# Patient Record
Sex: FEMALE | Race: BLACK OR AFRICAN AMERICAN | NOT HISPANIC OR LATINO | Employment: FULL TIME | URBAN - METROPOLITAN AREA
[De-identification: names, ages, dates, MRNs, and addresses within clinical notes are randomized per-mention and may not be internally consistent; named-entity substitution may affect disease eponyms.]

---

## 2021-02-15 ENCOUNTER — TRANSFERRED RECORDS (OUTPATIENT)
Dept: MULTI SPECIALTY CLINIC | Facility: CLINIC | Age: 26
End: 2021-02-15

## 2021-02-15 LAB — PAP-ABSTRACT: NORMAL

## 2021-08-10 ENCOUNTER — OFFICE VISIT (OUTPATIENT)
Dept: URGENT CARE | Facility: URGENT CARE | Age: 26
End: 2021-08-10
Payer: COMMERCIAL

## 2021-08-10 VITALS
WEIGHT: 174 LBS | OXYGEN SATURATION: 100 % | DIASTOLIC BLOOD PRESSURE: 119 MMHG | HEART RATE: 82 BPM | SYSTOLIC BLOOD PRESSURE: 171 MMHG | RESPIRATION RATE: 16 BRPM | TEMPERATURE: 97.5 F

## 2021-08-10 DIAGNOSIS — E87.6 LOW BLOOD POTASSIUM: Primary | ICD-10-CM

## 2021-08-10 DIAGNOSIS — R03.0 ELEVATED BLOOD PRESSURE READING WITHOUT DIAGNOSIS OF HYPERTENSION: ICD-10-CM

## 2021-08-10 PROCEDURE — 99203 OFFICE O/P NEW LOW 30 MIN: CPT | Performed by: PHYSICIAN ASSISTANT

## 2021-08-11 NOTE — PATIENT INSTRUCTIONS
Sent here by endocrinologist for correction of low potassium and an EKG.  Patient is not having any chest pain.  Potassium yesterday was 2.8.  We do not do IV potassium.  To [facility] for evaluation and treatment.

## 2021-08-11 NOTE — PROGRESS NOTES
Chief Complaint   Patient presents with     Abnormal Labs     Abnormal labs, low potassium and thyroid.             ASSESSMENT:    ICD-10-CM    1. Low blood potassium  E87.6    2. Elevated blood pressure reading without diagnosis of hypertension  R03.0              PLAN: We cannot do IV supplementation of potassium.  Low potassium of 2.8.  This could cause ectopic beats.  Recommend she go to the ER for evaluation and treatment.  BP elevated here. Patient will go to [place].  Advised about symptoms which might herald more serious problems.          Loulou Leung PA-C        Subjective: 25-year-old female is sent here by her endocrinologist for correction of her low potassium and EKG.  She denies any chest pain.  Potassium yesterday was 2.8.  Prior hospitalization in February 2001 for anemia, low potassium and palpitations.    No Known Allergies    No past medical history on file.    No current outpatient medications on file prior to visit.  No current facility-administered medications on file prior to visit.      Social History     Tobacco Use     Smoking status: Never Smoker     Smokeless tobacco: Never Used   Substance Use Topics     Alcohol use: None     Drug use: None       ROS:  General: negative for fever  ABD: Denies abd pain  : as above    OBJECTIVE:  BP (!) 171/119 (BP Location: Left arm, Patient Position: Sitting, Cuff Size: Adult Regular)   Pulse 82   Temp 97.5  F (36.4  C) (Tympanic)   Resp 16   Wt 78.9 kg (174 lb)   LMP 07/23/2021   SpO2 100%   Breastfeeding No    General:   awake, alert, and cooperative.  NAD.   Head: Normocephalic, atraumatic.  Eyes: Conjunctiva clear, non icteric.   ABD: soft, no tenderness to palpation , no rigidity, guarding or rebound . No CVAT  Neuro: Alert and oriented - normal speech.

## 2021-12-10 NOTE — PROGRESS NOTES
Nurse Note      Itinerary:  [facility]      Departure Date: 12/26/2021      Return Date: 01/14/2022      Length of Trip 22 DAYS      Reason for Travel: Visiting friends and relatives           Urban or rural: urban      Accommodations: Family home        IMMUNIZATION HISTORY  Have you received any immunizations within the past 4 weeks?  Yes  Have you ever fainted from having your blood drawn or from an injection?  No  Have you ever had a fever reaction to vaccination?  No  Have you ever had any bad reaction or side effect from any vaccination?  No  Have you ever had hepatitis A or B vaccine?  Yes  Do you live (or work closely) with anyone who has AIDS, an AIDS-like condition, any other immune disorder or who is on chemotherapy for cancer?  No  Do you have a family history of immunodeficiency?  No  Have you received any injection of immune globulin or any blood products during the past 12 months?  No    Patient roomed by             Jose June is a 26 year old female seen today alone for counsultation for international travel.   Patient will be departing in  11 day(s) and  traveling with family member(s).      Patient itinerary :  will be in the urban region [facility] which risk for Malaria, Yellow Fever, food borne illnesses, motor vehicle accidents and Covid. exposure.      Patient's activities will include visiting friends and relatives.    Patient's country of birth is [place]    Special medical concerns: HypoKalemia and elevated BP  Pre-travel questionnaire was completed by patient and reviewed by provider.     Vitals: BP (!) 147/96 (BP Location: Right arm, Patient Position: Sitting, Cuff Size: Adult Regular)   Pulse 97   Temp 98  F (36.7  C) (Oral)   BMI= There is no height or weight on file to calculate BMI.    EXAM:  General:  Well-nourished, well-developed in no acute distress.  Appears to be stated age, interacts appropriately and expresses understanding of information given to patient.    Current  Outpatient Medications   Medication Sig Dispense Refill     atovaquone-proguanil (MALARONE) 250-100 MG tablet Take 1 tablet by mouth daily Start 2 days before exposure to Malaria and continue daily till  7 days after exposure. 35 tablet 0     azithromycin (ZITHROMAX) 500 MG tablet Take 1 tablet (500 mg) by mouth daily for 3 doses Take 1 tablet a day for up to 3 days for severe diarrhea 3 tablet 0     potassium chloride ER (KLOR-CON) 10 MEQ CR tablet Take 1 tablet (10 mEq) by mouth daily       There is no problem list on file for this patient.    No Known Allergies      Immunizations discussed include:   Covid 19: Ordered/given today, risks, benefits and side effects reviewed  Hepatitis A:  Ordered/given today, risks, benefits and side effects reviewed  Hepatitis B: Up to date  Influenza: Up to date  Typhoid: Ordered/given today, risks, benefits and side effects reviewed  Rabies: Declined  reviewed managment of a animal bite or scratch (washing wound, seek medical care within 24 hours for post exposure prophylaxis )  Yellow Fever: Yellow Fever ordered/given today - side effects, precautions, allergies, risks discussed. Patient expressed understanding.  Kazakh Encephalitis: Not indicated  Meningococcus: Ordered/given today, risks, benefits and side effects reviewed  Tetanus/Diphtheria: Up to date  Measles/Mumps/Rubella: Up to date  Cholera: Not needed  Polio: Up to date  Pneumococcal: Under age of 65  Varicella: Up to date  Shingrix: Not indicated  HPV:  Up to date     TB: tested for Nursing program    Altitude Exposure on this trip: no  Past tolerance to Altitude: na    ASSESSMENT/PLAN:  Philipp was seen today for travel clinic and imm/inj.    Diagnoses and all orders for this visit:    Travel advice encounter  -     YELLOW FEVER IMMUNIZATN,LIVE,SUBCUT  -     HEPA-ADULT  -     TYPHOID VACCINE, IM  -     atovaquone-proguanil (MALARONE) 250-100 MG tablet; Take 1 tablet by mouth daily Start 2 days before exposure to  Malaria and continue daily till  7 days after exposure.  -     azithromycin (ZITHROMAX) 500 MG tablet; Take 1 tablet (500 mg) by mouth daily for 3 doses Take 1 tablet a day for up to 3 days for severe diarrhea    High priority for 2019-nCoV vaccine  -     COVID-19,PF,PFIZER (12+ Yrs PURPLE LABEL)    Encounter for screening laboratory testing for COVID-19 virus  -     Asymptomatic COVID-19 Virus (Coronavirus) by PCR Nose; Future    Other orders  -     MENINGOCOCCAL (MENACTRA )      I have reviewed general recommendations for safe travel   including: food/water precautions, insect precautions, safer sex   practices given high prevalence of Zika, HIV and other STDs,   roadway safety. Educational materials and [employer] report provided.    Malaraia prophylaxis recommended: Malarone  Symptomatic treatment for traveler's diarrhea: azithromycin    Personal protective measures reviewed including hand sanitizing and contact precautions for the prevention of viral illnesses. Cover coughs and masking  during travel and upon return.  Current COVID 19 pandemic.   Monitor / follow current CDC guidelines.    Country specific and CDC Covid 19  testing requirements and resources given to patient.    Covid 19 PCR test ordered.  Instructions for scheduling and resulting through My Chart given to patient.         Evacuation insurance advised and resources were provided to patient.    Total visit time 45 minutes  with over 50% of time spent counseling patient as detailed above.    Jagruti Langford CNP

## 2021-12-15 ENCOUNTER — OFFICE VISIT (OUTPATIENT)
Dept: FAMILY MEDICINE | Facility: CLINIC | Age: 26
End: 2021-12-15
Payer: COMMERCIAL

## 2021-12-15 VITALS — SYSTOLIC BLOOD PRESSURE: 147 MMHG | TEMPERATURE: 98 F | HEART RATE: 97 BPM | DIASTOLIC BLOOD PRESSURE: 96 MMHG

## 2021-12-15 DIAGNOSIS — Z23 HIGH PRIORITY FOR 2019-NCOV VACCINE: ICD-10-CM

## 2021-12-15 DIAGNOSIS — Z71.84 TRAVEL ADVICE ENCOUNTER: Primary | ICD-10-CM

## 2021-12-15 DIAGNOSIS — Z11.52 ENCOUNTER FOR SCREENING LABORATORY TESTING FOR COVID-19 VIRUS: ICD-10-CM

## 2021-12-15 PROCEDURE — 90472 IMMUNIZATION ADMIN EACH ADD: CPT | Mod: GA | Performed by: NURSE PRACTITIONER

## 2021-12-15 PROCEDURE — 90632 HEPA VACCINE ADULT IM: CPT | Mod: GA | Performed by: NURSE PRACTITIONER

## 2021-12-15 PROCEDURE — 90691 TYPHOID VACCINE IM: CPT | Mod: GA | Performed by: NURSE PRACTITIONER

## 2021-12-15 PROCEDURE — 0004A COVID-19,PF,PFIZER (12+ YRS): CPT | Performed by: NURSE PRACTITIONER

## 2021-12-15 PROCEDURE — 90717 YELLOW FEVER VACCINE SUBQ: CPT | Mod: GA | Performed by: NURSE PRACTITIONER

## 2021-12-15 PROCEDURE — 91300 COVID-19,PF,PFIZER (12+ YRS): CPT | Performed by: NURSE PRACTITIONER

## 2021-12-15 PROCEDURE — 90734 MENACWYD/MENACWYCRM VACC IM: CPT | Mod: GA | Performed by: NURSE PRACTITIONER

## 2021-12-15 PROCEDURE — 90471 IMMUNIZATION ADMIN: CPT | Mod: GA | Performed by: NURSE PRACTITIONER

## 2021-12-15 PROCEDURE — 99403 PREV MED CNSL INDIV APPRX 45: CPT | Mod: 25 | Performed by: NURSE PRACTITIONER

## 2021-12-15 RX ORDER — ATOVAQUONE AND PROGUANIL HYDROCHLORIDE 250; 100 MG/1; MG/1
1 TABLET, FILM COATED ORAL DAILY
Qty: 35 TABLET | Refills: 0 | Status: SHIPPED | OUTPATIENT
Start: 2021-12-15 | End: 2022-06-23

## 2021-12-15 RX ORDER — POTASSIUM CHLORIDE 750 MG/1
10 TABLET, EXTENDED RELEASE ORAL DAILY
COMMUNITY
Start: 2021-12-15 | End: 2022-04-29

## 2021-12-15 RX ORDER — AZITHROMYCIN 500 MG/1
500 TABLET, FILM COATED ORAL DAILY
Qty: 3 TABLET | Refills: 0 | Status: SHIPPED | OUTPATIENT
Start: 2021-12-15 | End: 2021-12-18

## 2021-12-15 NOTE — PATIENT INSTRUCTIONS
Thank you for visiting the [facility] : [phone]  Today December 15, 2021 you received the    Hepatitis A Vaccine - Please return on 6/13/22 or later for your 2nd and final dose.    Yellow Fever (YF)    Typhoid - injectable. This vaccine is valid for two years.     COVID 19 Pfizer    Meningitis     Follow up vaccine appointments can be made as a NURSE ONLY visit at the Travel Clinic, (BE PREPARED TO WAIT, ) or at designated [facility].    If you are receiving the Rabies vaccines series, it is important that you follow the exact schedule ordered.     Pre-travel     We recommend that you purchase Medical Evacuation Insurance prior to your departure.  Https://wwwnc.cdc.gov/travel/page/insurance    [place] your travel plans with the [employer] Department of State through STEP ( Smart Traveler Enrollment Program ) https://step.state.gov.  STEP is a free service to allow U[place] citizens and nationals traveling and living abroad to enroll their trip with the nearest U.S. Embassy or Consulate.    Animal Exposure: Avoid all mammals even if they look healthy.  If there is a bite, scratch or even a lick, wash area immediately with soap and water for 15 minutes and seek medical care within 24 hours for evaluation of Rabies post exposure treatment.  Contact your Medical Evacuation Insurance.    COVID 19 (Sars Cov2) prevention strategies  Physical distancing: Maintain 6 foot (2m) from others.              Avoid large gatherings and public transportation.   Avoid indoor shopping malls, theaters and restaurants   Practice consistent mask wearing covering the nose, mouth and underneath the chin when unable to maintain 6 foot distance from others.  Hand washing: frequent, thorough handwashing with soap and water for 20 seconds (or using a hand  containing 60% alcohol)   Avoid touching face, nose, eyes, mouth unless you have done appropriate hand washing as above.   Clean high  touch surfaces with approved disinfectant against Covid 19  (70% Ethanol ) or a bleach solution (add 20 mL (4 teaspoons) of bleach to 1 L (1 quart) of water;)  Be careful not to breath or touch bleach.      Travel Covid 19 Testing:  updated 12/06/2021  International travelers: Pre-travel: diagnostic testing (antigen or PCR) may be required for entry:  See country specific Embassy websites or airline websites.    US ENTRY Requirements: Effective December 6, 2021, all international arrivals to the [place] (regardless of vaccination status or citizenship status) by air are required to have a negative predeparture COVID-19 result from a test taken no more than 1 calendar day prior to departure of the flight to the [place]. Many complex scenarios may result from the 1-day rule. For example, for a flight that arrives in the [place] on a Monday, the test must have been taken no earlier than Sunday local time in the departure city. If the itinerary contains multiple flights, the test can be taken 1 day prior to departure of the first flight or can be taken en route, as long as the connecting airport is not in the [place]. If the test is unable to be taken en route, the traveler will not be able to enter the [place], or if the test is taken en route and is positive, the traveler will have to isolate in that location. If the itinerary contains 1 or more overnight stays en route to the [place], the test must have been taken 1 calendar day before the flight that will enter the [place]; however, if the itinerary has an overnight connection due to limitations in flight availability, retesting will not be required. If the first flight within an itinerary is delayed due to severe weather, aircraft mechanical issues, or other issues outside of the traveler's control, the traveler will only need to be retested if the delay causes the original test to be 24 hours or more past the 1-day window. If a connecting flight is delayed due to any of the above issues, the  traveler will only need to be retested if the delay causes the original test to be 48 hours or more past the 1-day window. If a trip of less than 1 day is made out the [place], a viral test taken in the [place] may be used as a predeparture test as long as the test was taken no more than 1 day prior to rearrival in the [place]; however, if a delay occurs on the return trip and the predeparture test is out of the 1-day window, the traveler will need to be retested before returning to the [place]. Noncitizen nonimmigrants who are unvaccinated remain banned from entry    Post travel: CDC recommends getting tested 3-5 days after your trip AND stay home and self-quarantine for 7 days.      COVID-19 testing scheduling number for pre-travel through [facility]  [phone] (Must have an order). Available 24 hours a day.  You can also schedule through My Chart.     Post-travel illness:  Contact your provider or [facility] if you develop a fever, rash, cough, diarrhea or other symptoms for up to 1 year after travel.  Inform your healthcare provider when and where you traveled to.    Please call the [facility] with any questions [phone]  Or send your provider a 'My Chart' note.         https://tg.[employer].gov/q-v-jlwxbfa-services/security-and-travel-information/covid-19/    [email or web address]/[email or web address]

## 2021-12-23 ENCOUNTER — LAB (OUTPATIENT)
Dept: URGENT CARE | Facility: URGENT CARE | Age: 26
End: 2021-12-23
Attending: NURSE PRACTITIONER
Payer: COMMERCIAL

## 2021-12-23 DIAGNOSIS — Z11.52 ENCOUNTER FOR SCREENING LABORATORY TESTING FOR COVID-19 VIRUS: ICD-10-CM

## 2021-12-23 PROCEDURE — U0003 INFECTIOUS AGENT DETECTION BY NUCLEIC ACID (DNA OR RNA); SEVERE ACUTE RESPIRATORY SYNDROME CORONAVIRUS 2 (SARS-COV-2) (CORONAVIRUS DISEASE [COVID-19]), AMPLIFIED PROBE TECHNIQUE, MAKING USE OF HIGH THROUGHPUT TECHNOLOGIES AS DESCRIBED BY CMS-2020-01-R: HCPCS

## 2021-12-23 PROCEDURE — U0005 INFEC AGEN DETEC AMPLI PROBE: HCPCS

## 2021-12-24 LAB — SARS-COV-2 RNA RESP QL NAA+PROBE: NEGATIVE

## 2021-12-26 NOTE — RESULT ENCOUNTER NOTE
Philipp June  YOB: 1995  Specimen Collected: 12/23/21  4:01 PM  CST    Your Covid 19 PCR test results are NEGATIVE.  Print off the entire results and keep with your travel documents    Have a safe trip    Jagruti Langford (Lori) CNP

## 2022-02-06 ENCOUNTER — HEALTH MAINTENANCE LETTER (OUTPATIENT)
Age: 27
End: 2022-02-06

## 2022-02-08 PROBLEM — H52.13 MYOPIA OF BOTH EYES WITH ASTIGMATISM: Status: ACTIVE | Noted: 2019-04-22

## 2022-02-08 PROBLEM — R94.6 ABNORMAL RESULTS OF THYROID FUNCTION STUDIES: Status: ACTIVE | Noted: 2021-10-05

## 2022-02-08 PROBLEM — H04.123 DRY EYES: Status: ACTIVE | Noted: 2019-04-22

## 2022-02-08 PROBLEM — A18.2: Status: ACTIVE | Noted: 2018-12-31

## 2022-02-08 PROBLEM — E21.1 HYPERPARATHYROIDISM DUE TO VITAMIN D DEFICIENCY (H): Status: ACTIVE | Noted: 2021-10-14

## 2022-02-08 PROBLEM — E55.9 VITAMIN D DEFICIENCY: Status: ACTIVE | Noted: 2021-10-05

## 2022-02-08 PROBLEM — H52.203 MYOPIA OF BOTH EYES WITH ASTIGMATISM: Status: ACTIVE | Noted: 2019-04-22

## 2022-02-08 PROBLEM — H57.89 REDNESS OF BOTH EYES: Status: ACTIVE | Noted: 2019-03-27

## 2022-02-08 PROBLEM — R00.2 PALPITATION: Status: ACTIVE | Noted: 2018-12-31

## 2022-02-08 NOTE — PROGRESS NOTES
"  Assessment & Plan       ICD-10-CM    1. Hypertension goal BP (blood pressure) < 140/90  I10 EKG 12-lead complete w/read - Clinics     EKG 12-lead complete w/read - Clinics     metoprolol succinate ER (TOPROL-XL) 25 MG 24 hr tablet     Echocardiogram Complete     US Renal Complete w Doppler Complete     Comprehensive metabolic panel (BMP + Alb, Alk Phos, ALT, AST, Total. Bili, TP)     Vitamin D Deficiency     Comprehensive metabolic panel (BMP + Alb, Alk Phos, ALT, AST, Total. Bili, TP)     Vitamin D Deficiency   2. Vitamin D deficiency  E55.9    3. Hyperparathyroidism due to vitamin D deficiency (H)  E21.1    Talk to patient about her concerns.  Labs are pending.  We will get her started on metoprolol 25 mg a day we will recheck her blood pressure in 2 weeks.  We will get an echo and a renal ultrasound done in the meantime.      BMI:   Estimated body mass index is 27.98 kg/m  as calculated from the following:    Height as of this encounter: 1.727 m (5' 8\").    Weight as of this encounter: 83.5 kg (184 lb).   Weight management plan: Discussed healthy diet and exercise guidelines      Return in about 2 weeks (around 2/25/2022) for BP Recheck.    Kenny Nowak PA-C  [facility]    Jose Pittman is a 26 year old who presents for the following health issues     HPI     Patient is in clinic for high blood pressure for the past couple months. Patient would also like to have Potassium and vitamin D levels checked.      STEFANIA Hassan    Has been seen in outside clinic and had elevated blood pressure readings.  She thinks is been going on for over a year.  She denies any chest pain or shortness of breath.  She is in nursing school.  She recently was seen in outside endocrinologist for low TSH.  She had follow-up lab work that appear to be stable.  Care everywhere was reviewed.  She denies any family history of heart disease.  She states she otherwise feels fine.  She denies any " "daytime fatigue.  She states she exercises regularly with no symptoms.  Review of Systems   Constitutional, HEENT, cardiovascular, pulmonary, gi and gu systems are negative, except as otherwise noted.      Objective    BP (!) 171/117   Pulse 80   Temp 98.2  F (36.8  C) (Tympanic)   Resp 18   Ht 1.727 m (5' 8\")   Wt 83.5 kg (184 lb)   SpO2 100%   BMI 27.98 kg/m    Body mass index is 27.98 kg/m .  Physical Exam   GENERAL: healthy, alert and no distress  EYES: Eyes grossly normal to inspection, PERRL and conjunctivae and sclerae normal  HENT: ear canals and TM's normal, nose and mouth without ulcers or lesions  NECK: no adenopathy, no asymmetry, masses, or scars and thyroid normal to palpation  RESP: lungs clear to auscultation - no rales, rhonchi or wheezes  CV: regular rate and rhythm, normal S1 S2, no S3 or S4, no murmur, click or rub, no peripheral edema and peripheral pulses strong  ABDOMEN: soft, nontender, no hepatosplenomegaly, no masses and bowel sounds normal  MS: no gross musculoskeletal defects noted, no edema  SKIN: no suspicious lesions or rashes  NEURO: Normal strength and tone, mentation intact and speech normal  PSYCH: mentation appears normal, affect normal/bright    Labs pending  EKG: NSR, none to compare.   We are having difficulties with the machine so she had an abnormal EKG first reading and then we got a different machine and it in the been normal.          "

## 2022-02-11 ENCOUNTER — OFFICE VISIT (OUTPATIENT)
Dept: FAMILY MEDICINE | Facility: CLINIC | Age: 27
End: 2022-02-11
Payer: COMMERCIAL

## 2022-02-11 VITALS
RESPIRATION RATE: 18 BRPM | DIASTOLIC BLOOD PRESSURE: 117 MMHG | HEART RATE: 80 BPM | OXYGEN SATURATION: 100 % | HEIGHT: 68 IN | WEIGHT: 184 LBS | SYSTOLIC BLOOD PRESSURE: 171 MMHG | TEMPERATURE: 98.2 F | BODY MASS INDEX: 27.89 KG/M2

## 2022-02-11 DIAGNOSIS — E21.1 HYPERPARATHYROIDISM DUE TO VITAMIN D DEFICIENCY (H): ICD-10-CM

## 2022-02-11 DIAGNOSIS — E55.9 VITAMIN D DEFICIENCY: ICD-10-CM

## 2022-02-11 DIAGNOSIS — R93.429 ABNORMAL ULTRASOUND OF KIDNEY: ICD-10-CM

## 2022-02-11 DIAGNOSIS — I10 HYPERTENSION GOAL BP (BLOOD PRESSURE) < 140/90: Primary | ICD-10-CM

## 2022-02-11 LAB
ALBUMIN SERPL-MCNC: 3.9 G/DL (ref 3.4–5)
ALP SERPL-CCNC: 70 U/L (ref 40–150)
ALT SERPL W P-5'-P-CCNC: 30 U/L (ref 0–50)
ANION GAP SERPL CALCULATED.3IONS-SCNC: 2 MMOL/L (ref 3–14)
AST SERPL W P-5'-P-CCNC: 19 U/L (ref 0–45)
BILIRUB SERPL-MCNC: 0.6 MG/DL (ref 0.2–1.3)
BUN SERPL-MCNC: 8 MG/DL (ref 7–30)
CALCIUM SERPL-MCNC: 8.5 MG/DL (ref 8.5–10.1)
CHLORIDE BLD-SCNC: 107 MMOL/L (ref 94–109)
CO2 SERPL-SCNC: 31 MMOL/L (ref 20–32)
CREAT SERPL-MCNC: 0.65 MG/DL (ref 0.52–1.04)
GFR SERPL CREATININE-BSD FRML MDRD: >90 ML/MIN/1.73M2
GLUCOSE BLD-MCNC: 87 MG/DL (ref 70–99)
POTASSIUM BLD-SCNC: 3.5 MMOL/L (ref 3.4–5.3)
PROT SERPL-MCNC: 7.7 G/DL (ref 6.8–8.8)
SODIUM SERPL-SCNC: 140 MMOL/L (ref 133–144)

## 2022-02-11 PROCEDURE — 93000 ELECTROCARDIOGRAM COMPLETE: CPT | Performed by: PHYSICIAN ASSISTANT

## 2022-02-11 PROCEDURE — 80053 COMPREHEN METABOLIC PANEL: CPT | Performed by: PHYSICIAN ASSISTANT

## 2022-02-11 PROCEDURE — 82306 VITAMIN D 25 HYDROXY: CPT | Performed by: PHYSICIAN ASSISTANT

## 2022-02-11 PROCEDURE — 99214 OFFICE O/P EST MOD 30 MIN: CPT | Performed by: PHYSICIAN ASSISTANT

## 2022-02-11 PROCEDURE — 36415 COLL VENOUS BLD VENIPUNCTURE: CPT | Performed by: PHYSICIAN ASSISTANT

## 2022-02-11 RX ORDER — BIOTIN 1 MG
1000 TABLET ORAL EVERY OTHER DAY
COMMUNITY
End: 2022-12-12

## 2022-02-11 RX ORDER — METOPROLOL SUCCINATE 25 MG/1
25 TABLET, EXTENDED RELEASE ORAL DAILY
Qty: 30 TABLET | Refills: 0 | Status: SHIPPED | OUTPATIENT
Start: 2022-02-11 | End: 2022-03-15

## 2022-02-11 RX ORDER — POTASSIUM CHLORIDE 750 MG/1
10 TABLET, EXTENDED RELEASE ORAL DAILY
COMMUNITY
Start: 2022-01-08 | End: 2022-02-11

## 2022-02-11 ASSESSMENT — MIFFLIN-ST. JEOR: SCORE: 1623.12

## 2022-02-11 ASSESSMENT — PAIN SCALES - GENERAL: PAINLEVEL: NO PAIN (0)

## 2022-02-14 LAB — DEPRECATED CALCIDIOL+CALCIFEROL SERPL-MC: 49 UG/L (ref 20–75)

## 2022-02-15 NOTE — RESULT ENCOUNTER NOTE
Ms. Slade,    All of your labs were normal/near normal for you.    Please contact the clinic if you have additional questions.  Thank you.    Sincerely,    Kenny Nowak PA-C

## 2022-02-18 ENCOUNTER — ANCILLARY PROCEDURE (OUTPATIENT)
Dept: ULTRASOUND IMAGING | Facility: CLINIC | Age: 27
End: 2022-02-18
Attending: PHYSICIAN ASSISTANT
Payer: COMMERCIAL

## 2022-02-18 DIAGNOSIS — I10 HYPERTENSION GOAL BP (BLOOD PRESSURE) < 140/90: ICD-10-CM

## 2022-02-18 PROCEDURE — 93975 VASCULAR STUDY: CPT | Performed by: RADIOLOGY

## 2022-02-28 ENCOUNTER — MYC MEDICAL ADVICE (OUTPATIENT)
Dept: FAMILY MEDICINE | Facility: CLINIC | Age: 27
End: 2022-02-28
Payer: COMMERCIAL

## 2022-03-10 ENCOUNTER — ANCILLARY PROCEDURE (OUTPATIENT)
Dept: CARDIOLOGY | Facility: CLINIC | Age: 27
End: 2022-03-10
Attending: PHYSICIAN ASSISTANT
Payer: COMMERCIAL

## 2022-03-10 DIAGNOSIS — I10 HYPERTENSION GOAL BP (BLOOD PRESSURE) < 140/90: ICD-10-CM

## 2022-03-10 LAB — LVEF ECHO: NORMAL

## 2022-03-10 PROCEDURE — 93306 TTE W/DOPPLER COMPLETE: CPT | Performed by: INTERNAL MEDICINE

## 2022-03-14 DIAGNOSIS — I10 HYPERTENSION GOAL BP (BLOOD PRESSURE) < 140/90: ICD-10-CM

## 2022-03-14 NOTE — TELEPHONE ENCOUNTER
Routing refill request to provider for review/approval because:  BP Readings from Last 3 Encounters:   02/11/22 (!) 171/117   12/15/21 (!) 147/96   08/10/21 (!) 171/119     Jagruti PANDYAN, RN

## 2022-03-15 RX ORDER — METOPROLOL SUCCINATE 25 MG/1
25 TABLET, EXTENDED RELEASE ORAL DAILY
Qty: 30 TABLET | Refills: 0 | Status: SHIPPED | OUTPATIENT
Start: 2022-03-15 | End: 2022-04-29

## 2022-03-15 NOTE — TELEPHONE ENCOUNTER
Called patient this morning had to leave a telephone msg. Jia Weaver     [facility]  [facility]

## 2022-04-29 ENCOUNTER — OFFICE VISIT (OUTPATIENT)
Dept: CARDIOLOGY | Facility: CLINIC | Age: 27
End: 2022-04-29
Payer: COMMERCIAL

## 2022-04-29 VITALS
SYSTOLIC BLOOD PRESSURE: 157 MMHG | HEART RATE: 94 BPM | BODY MASS INDEX: 27.69 KG/M2 | OXYGEN SATURATION: 100 % | DIASTOLIC BLOOD PRESSURE: 117 MMHG | WEIGHT: 182.1 LBS

## 2022-04-29 DIAGNOSIS — I10 HYPERTENSION GOAL BP (BLOOD PRESSURE) < 140/90: Primary | ICD-10-CM

## 2022-04-29 PROCEDURE — 99203 OFFICE O/P NEW LOW 30 MIN: CPT | Performed by: INTERNAL MEDICINE

## 2022-04-29 RX ORDER — LOSARTAN POTASSIUM AND HYDROCHLOROTHIAZIDE 12.5; 5 MG/1; MG/1
1 TABLET ORAL DAILY
Qty: 90 TABLET | Refills: 3 | Status: SHIPPED | OUTPATIENT
Start: 2022-04-29 | End: 2022-05-12

## 2022-04-29 RX ORDER — AMLODIPINE BESYLATE 10 MG/1
10 TABLET ORAL DAILY
Qty: 90 TABLET | Refills: 3 | Status: SHIPPED | OUTPATIENT
Start: 2022-04-29 | End: 2022-04-29

## 2022-04-29 NOTE — NURSING NOTE
Philipp June's goals for this visit include:   Chief Complaint   Patient presents with     New Patient     Referred by Kenny Nowak  Abnormal Echo        She requests these members of her care team be copied on today's visit information: no     PCP: [facility]    Referring Provider:  Kenny Nowak PA-C  [id number] [place] [address]    BP (!) 157/117 (BP Location: Left arm, Patient Position: Chair, Cuff Size: Adult Regular)   Pulse 94   Wt 82.6 kg (182 lb 1.6 oz)   SpO2 100%   BMI 27.69 kg/m      Do you need any medication refills at today's visit? No     Bebeto CHAPPELL MA   Cardiology Team  [facility]

## 2022-04-29 NOTE — PROGRESS NOTES
[facility] Cardiology Consultation:    Assessment and Plan:     1.  Hypertension, uncontrolled: Stop metoprolol and potassium supplements.  Start losartan/HCTZ combo 50/12.5 every day, and check BMP in 1 week.  Asked to monitor BP at home and let us know in 2 weeks    Follow-up in clinic in 3 months      Jitendra Fuchs MD    Cardiac Imaging and Prevention  [facility]  [email or web address] Pager: [phone]    HPI: Referred by her PCP for evaluation of hypertension and recent echo result.  She has recently been diagnosed with elevated blood pressures, and was prescribed metoprolol.  She has taken this on and off and is currently not taking it.  Office blood pressures have been high at least since last year.  She has been trying to focus on improving her diet and exercise more.  There is no clear family history of hypertension.  Her father has elevated blood pressure but is not on any treatment.  She does not know the medical history of her grandparents.  She does not smoke, or drink alcohol, or use illicit drugs.  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  I reviewed her recent EKG and it shows normal sinus rhythm with LVH.  I reviewed her recent echo and it shows normal cardiac function, mild LVH, and no other structural abnormality.  She reports feeling transient fluttering in her chest recently. Denies any chest pain or pressure, shortness of breath/dyspnea, orthopnea, pnd, syncope/presyncope or edema.  Basic labs, including liver function, urinalysis, thyroid function, and lipid profile were normal when checked last year.  She is also been prescribed 10 mEq of potassium supplement.    EXAM:  BP (!) 157/117 (BP Location: Left arm, Patient Position: Chair, Cuff Size: Adult Regular)   Pulse 94   Wt 82.6 kg (182 lb 1.6 oz)   SpO2 100%   BMI 27.69 kg/m    GEN/CONSTITUIONAL: Appears comfortable, in no apparent distress   EYES: No icterus  ENT/MOUTH: Normal  JVP:  Not  visible  RESPIRATORY: Clear to auscultation bilaterally   CARDIOVASCULAR: Regular S1 and S2, no murmurs, rubs, or gallops.   ABDOMEN: Soft, non-tender, positive bowel sounds   NEUROLOGIC: Grossly non-focal   PSYCHIATRIC: Normal affect  EXT: No cyanosis, clubbing, edema. Normal pedal pulses.  Skin: No petechiae, purpura or rash    PAST MEDICAL HISTORY:  Past Medical History:   Diagnosis Date     Benign essential hypertension        CURRENT MEDICATIONS:  Current Outpatient Medications   Medication     biotin 1000 MCG TABS tablet     cholecalciferol (VITAMIN D3) 125 mcg (5000 units) capsule     Multiple Vitamin (MULTIVITAMIN ADULT PO)     potassium chloride ER (K-TAB/KLOR-CON) 10 MEQ CR tablet     atovaquone-proguanil (MALARONE) 250-100 MG tablet     metoprolol succinate ER (TOPROL-XL) 25 MG 24 hr tablet     No current facility-administered medications for this visit.       PAST SURGICAL HISTORY:  No past surgical history on file.    ALLERGIES   No Known Allergies    FAMILY HISTORY:  History reviewed. No pertinent family history.    SOCIAL HISTORY:  Social History     Socioeconomic History     Marital status: Single     Spouse name: Not on file     Number of children: Not on file     Years of education: Not on file     Highest education level: Not on file   Occupational History     Not on file   Tobacco Use     Smoking status: Never Smoker     Smokeless tobacco: Never Used   Substance and Sexual Activity     Alcohol use: Not on file     Drug use: Not on file     Sexual activity: Not on file   Other Topics Concern     Not on file   Social History Narrative     Not on file     Social Determinants of Health     Financial Resource Strain: Not on file   Food Insecurity: Not on file   Transportation Needs: Not on file   Physical Activity: Not on file   Stress: Not on file   Social Connections: Not on file   Intimate Partner Violence: Not on file   Housing Stability: Not on file       ROS:   Constitutional: No fever, chills,  or sweats. No weight gain/loss   ENT: No visual disturbance, ear ache, epistaxis, sore throat  Allergies/Immunologic: Negative.   Respiratory: No cough, hemoptysia  Cardiovascular: As per HPI  GI: No nausea, vomiting, hematemesis, melena, or hematochezia  : No urinary frequency, dysuria, or hematuria  Integument: Negative  Psychiatric: Negative  Neuro: Negative  Endocrinology: Negative   Musculoskeletal: Negative    ADDITIONAL COMMENTS:     I reviewed the patient's medications:     I reviewed the patient's pertinent clinical laboratory studies:     I reviewed the patient's pertinent imaging studies:   I reviewed the patient's ECG:

## 2022-05-01 ENCOUNTER — HOSPITAL ENCOUNTER (EMERGENCY)
Facility: CLINIC | Age: 27
Discharge: HOME | End: 2022-05-01
Attending: EMERGENCY MEDICINE | Admitting: EMERGENCY MEDICINE
Payer: COMMERCIAL

## 2022-05-01 VITALS
RESPIRATION RATE: 16 BRPM | SYSTOLIC BLOOD PRESSURE: 142 MMHG | OXYGEN SATURATION: 97 % | HEART RATE: 60 BPM | DIASTOLIC BLOOD PRESSURE: 106 MMHG | TEMPERATURE: 98 F | WEIGHT: 180 LBS

## 2022-05-01 DIAGNOSIS — R55 PRE-SYNCOPE: ICD-10-CM

## 2022-05-01 DIAGNOSIS — E87.6 HYPOKALEMIA: ICD-10-CM

## 2022-05-01 LAB
ANION GAP SERPL CALCULATED.3IONS-SCNC: 6 MMOL/L (ref 3–14)
ATRIAL RATE - MUSE: 74 BPM
BASOPHILS # BLD AUTO: 0 10E3/UL (ref 0–0.2)
BASOPHILS NFR BLD AUTO: 1 %
BUN SERPL-MCNC: 14 MG/DL (ref 7–30)
CA-I BLD-MCNC: 5.1 MG/DL (ref 4.4–5.2)
CALCIUM SERPL-MCNC: 9.1 MG/DL (ref 8.5–10.1)
CHLORIDE BLD-SCNC: 104 MMOL/L (ref 94–109)
CO2 SERPL-SCNC: 29 MMOL/L (ref 20–32)
CPB POCT: NO
CREAT SERPL-MCNC: 0.77 MG/DL (ref 0.52–1.04)
DIASTOLIC BLOOD PRESSURE - MUSE: NORMAL MMHG
EOSINOPHIL # BLD AUTO: 0.1 10E3/UL (ref 0–0.7)
EOSINOPHIL NFR BLD AUTO: 2 %
ERYTHROCYTE [DISTWIDTH] IN BLOOD BY AUTOMATED COUNT: 13.4 % (ref 10–15)
GFR SERPL CREATININE-BSD FRML MDRD: >90 ML/MIN/1.73M2
GLUCOSE BLD-MCNC: 102 MG/DL (ref 70–99)
GLUCOSE BLD-MCNC: 86 MG/DL (ref 70–99)
HCG UR QL: NEGATIVE
HCO3 BLDV-SCNC: 29 MMOL/L (ref 21–28)
HCT VFR BLD AUTO: 38.3 % (ref 35–47)
HCT VFR BLD CALC: 39 % (ref 35–47)
HGB BLD-MCNC: 13 G/DL (ref 11.7–15.7)
HGB BLD-MCNC: 13.3 G/DL (ref 11.7–15.7)
IMM GRANULOCYTES # BLD: 0 10E3/UL
IMM GRANULOCYTES NFR BLD: 0 %
INTERPRETATION ECG - MUSE: NORMAL
LYMPHOCYTES # BLD AUTO: 1.8 10E3/UL (ref 0.8–5.3)
LYMPHOCYTES NFR BLD AUTO: 31 %
MAGNESIUM SERPL-MCNC: 1.9 MG/DL (ref 1.6–2.3)
MCH RBC QN AUTO: 29.8 PG (ref 26.5–33)
MCHC RBC AUTO-ENTMCNC: 33.9 G/DL (ref 31.5–36.5)
MCV RBC AUTO: 88 FL (ref 78–100)
MONOCYTES # BLD AUTO: 0.5 10E3/UL (ref 0–1.3)
MONOCYTES NFR BLD AUTO: 8 %
NEUTROPHILS # BLD AUTO: 3.3 10E3/UL (ref 1.6–8.3)
NEUTROPHILS NFR BLD AUTO: 58 %
NRBC # BLD AUTO: 0 10E3/UL
NRBC BLD AUTO-RTO: 0 /100
P AXIS - MUSE: 60 DEGREES
PCO2 BLDV: 47 MM HG (ref 40–50)
PH BLDV: 7.4 [PH] (ref 7.32–7.43)
PLATELET # BLD AUTO: 240 10E3/UL (ref 150–450)
PO2 BLDV: 18 MM HG (ref 25–47)
POTASSIUM BLD-SCNC: 2.5 MMOL/L (ref 3.4–5.3)
POTASSIUM BLD-SCNC: 2.7 MMOL/L (ref 3.4–5.3)
POTASSIUM BLD-SCNC: 2.9 MMOL/L (ref 3.4–5.3)
PR INTERVAL - MUSE: 174 MS
QRS DURATION - MUSE: 86 MS
QT - MUSE: 384 MS
QTC - MUSE: 426 MS
R AXIS - MUSE: 32 DEGREES
RBC # BLD AUTO: 4.36 10E6/UL (ref 3.8–5.2)
SAO2 % BLDV: 25 % (ref 94–100)
SODIUM BLD-SCNC: 141 MMOL/L (ref 133–144)
SODIUM SERPL-SCNC: 139 MMOL/L (ref 133–144)
SYSTOLIC BLOOD PRESSURE - MUSE: NORMAL MMHG
T AXIS - MUSE: -4 DEGREES
TROPONIN I SERPL HS-MCNC: 4 NG/L
TROPONIN I SERPL HS-MCNC: 6 NG/L
VENTRICULAR RATE- MUSE: 74 BPM
WBC # BLD AUTO: 5.7 10E3/UL (ref 4–11)

## 2022-05-01 PROCEDURE — 250N000011 HC RX IP 250 OP 636: Performed by: EMERGENCY MEDICINE

## 2022-05-01 PROCEDURE — 93010 ELECTROCARDIOGRAM REPORT: CPT | Performed by: EMERGENCY MEDICINE

## 2022-05-01 PROCEDURE — 258N000003 HC RX IP 258 OP 636: Performed by: EMERGENCY MEDICINE

## 2022-05-01 PROCEDURE — 93005 ELECTROCARDIOGRAM TRACING: CPT | Performed by: EMERGENCY MEDICINE

## 2022-05-01 PROCEDURE — 250N000013 HC RX MED GY IP 250 OP 250 PS 637: Performed by: EMERGENCY MEDICINE

## 2022-05-01 PROCEDURE — 85025 COMPLETE CBC W/AUTO DIFF WBC: CPT | Performed by: EMERGENCY MEDICINE

## 2022-05-01 PROCEDURE — 82310 ASSAY OF CALCIUM: CPT | Performed by: EMERGENCY MEDICINE

## 2022-05-01 PROCEDURE — 83735 ASSAY OF MAGNESIUM: CPT | Performed by: EMERGENCY MEDICINE

## 2022-05-01 PROCEDURE — 96361 HYDRATE IV INFUSION ADD-ON: CPT | Performed by: EMERGENCY MEDICINE

## 2022-05-01 PROCEDURE — 82330 ASSAY OF CALCIUM: CPT

## 2022-05-01 PROCEDURE — 96365 THER/PROPH/DIAG IV INF INIT: CPT | Performed by: EMERGENCY MEDICINE

## 2022-05-01 PROCEDURE — 99285 EMERGENCY DEPT VISIT HI MDM: CPT | Mod: 25 | Performed by: EMERGENCY MEDICINE

## 2022-05-01 PROCEDURE — 84484 ASSAY OF TROPONIN QUANT: CPT | Performed by: EMERGENCY MEDICINE

## 2022-05-01 PROCEDURE — 36415 COLL VENOUS BLD VENIPUNCTURE: CPT | Performed by: EMERGENCY MEDICINE

## 2022-05-01 PROCEDURE — 81025 URINE PREGNANCY TEST: CPT | Performed by: EMERGENCY MEDICINE

## 2022-05-01 PROCEDURE — 84132 ASSAY OF SERUM POTASSIUM: CPT | Performed by: EMERGENCY MEDICINE

## 2022-05-01 RX ORDER — POTASSIUM CHLORIDE 750 MG/1
10 TABLET, EXTENDED RELEASE ORAL DAILY
Qty: 14 TABLET | Refills: 0 | Status: SHIPPED | OUTPATIENT
Start: 2022-05-01 | End: 2022-05-12

## 2022-05-01 RX ORDER — POTASSIUM CHLORIDE 7.45 MG/ML
10 INJECTION INTRAVENOUS ONCE
Status: COMPLETED | OUTPATIENT
Start: 2022-05-01 | End: 2022-05-01

## 2022-05-01 RX ORDER — POTASSIUM CHLORIDE 750 MG/1
40 TABLET, EXTENDED RELEASE ORAL ONCE
Status: COMPLETED | OUTPATIENT
Start: 2022-05-01 | End: 2022-05-01

## 2022-05-01 RX ORDER — POTASSIUM CHLORIDE 750 MG/1
10 TABLET, EXTENDED RELEASE ORAL ONCE
Status: COMPLETED | OUTPATIENT
Start: 2022-05-01 | End: 2022-05-01

## 2022-05-01 RX ADMIN — POTASSIUM CHLORIDE 40 MEQ: 750 TABLET, EXTENDED RELEASE ORAL at 12:44

## 2022-05-01 RX ADMIN — SODIUM CHLORIDE 1000 ML: 9 INJECTION, SOLUTION INTRAVENOUS at 11:38

## 2022-05-01 RX ADMIN — POTASSIUM CHLORIDE 10 MEQ: 750 TABLET, EXTENDED RELEASE ORAL at 15:42

## 2022-05-01 RX ADMIN — POTASSIUM CHLORIDE 10 MEQ: 7.46 INJECTION, SOLUTION INTRAVENOUS at 12:45

## 2022-05-01 NOTE — ED TRIAGE NOTES
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 She did not pass out. She states she has been eating/drinking well and there are no stressors in life right now to cause anxiety. She believes that it could have been from watching the dressing change.    VSS    Does not report dizziness at the moment, but they wanted her to come get checked out.    She was recently started on losartan also.     S      Triage Assessment     Row Name 05/01/22 105       Triage Assessment (Adult)    Airway WDL WDL       Respiratory WDL    Respiratory WDL WDL       Skin Circulation/Temperature WDL    Skin Circulation/Temperature WDL WDL       Cardiac WDL    Cardiac WDL WDL       Peripheral/Neurovascular WDL    Peripheral Neurovascular WDL WDL       Cognitive/Neuro/Behavioral WDL    Cognitive/Neuro/Behavioral WDL WDL

## 2022-05-01 NOTE — DISCHARGE INSTRUCTIONS
Please make an appointment to follow up with Cardiology Clinic (phone: [phone]) and your primary care physician as soon as possible. Follow up as scheduled for your labs on Friday to recheck your potassium. Take the potassium until this recheck and then discuss whether you should continue with your cardiology doctor.     Take the 10 mEq of potassium chloride daily. You do not need to take anymore today. Eat and drink normally.     Return for any new or worsening concerns.

## 2022-05-01 NOTE — ED PROVIDER NOTES
ED Provider Note  [facility]      History     Chief Complaint   Patient presents with     Dizziness     HPI  Philipp Lizarraga is a 26 year old female who initially reports being otherwise healthy who presents with pre-syncopal episode.  Patient is a nursing student and doing clinical upstairs.  She states she was watching a wound dressing change when she began to feel lightheaded.  She states she did not fully lose consciousness.  She sat down and had something to drink and started to feel better.  She states that she did not eat breakfast or drink much today.  She believes she started feeling this way related to watching the dressing change.  She denies any current symptoms and states she is feeling back to normal.  She states that they wanted her to come down to the ER to get checked out so she is here for evaluation.  She reports she felt somewhat nauseous with this also.  No vomiting.  Denies any recent vomiting or diarrhea.  Denies any abdominal pain.  No chest pain or shortness of breath.  Denied having an episode like this in the past.  No known family history of sudden death or significant cardiac disease.    Initially the patient's name was misspelled.  I was unable to access her correct record and it was noted that she was she had seen cardiology related to hypertension. She was reviously on metoprolol with potassium supplements. She had had a previous EKG that showed LVH so she had an echo which showed normal cardiac function, mild LVH, and no other structural abnormality.  She was switched to losartan/hydrochlorothiazide combo and told to stop the potassium supplements.    Past Medical History  Past Medical History:   Diagnosis Date     Benign essential hypertension      No past surgical history on file.  atovaquone-proguanil (MALARONE) 250-100 MG tablet  biotin 1000 MCG TABS tablet  cholecalciferol (VITAMIN D3) 125 mcg (5000 units) capsule  losartan (COZAAR) 100 MG  tablet  Multiple Vitamin (MULTIVITAMIN ADULT PO)  potassium chloride ER (KLOR-CON) 20 MEQ CR tablet      No Known Allergies  Family History  No family history on file.  Social History   Social History     Tobacco Use     Smoking status: Never Smoker     Smokeless tobacco: Never Used   Substance Use Topics     Alcohol use: Never     Drug use: Never      Past medical history, past surgical history, medications, allergies, family history, and social history were reviewed with the patient. No additional pertinent items.       Review of Systems  A complete review of systems was performed with pertinent positives and negatives noted in the HPI, and all other systems negative.    Physical Exam   BP: (!) 140/92  Pulse: 86  Temp: 98  F (36.7  C)  Resp: 18  Weight: 81.6 kg (180 lb)  SpO2: 99 %  Physical Exam  Vitals reviewed.   Constitutional:       General: She is not in acute distress.     Appearance: She is well-developed.   HENT:      Head: Normocephalic and atraumatic.   Eyes:      General: No visual field deficit.     Extraocular Movements: Extraocular movements intact.      Conjunctiva/sclera: Conjunctivae normal.      Pupils: Pupils are equal, round, and reactive to light.   Cardiovascular:      Rate and Rhythm: Normal rate and regular rhythm.      Pulses: Normal pulses.      Heart sounds: Normal heart sounds. No murmur heard.  Pulmonary:      Effort: Pulmonary effort is normal. No respiratory distress.      Breath sounds: Normal breath sounds. No wheezing or rales.   Abdominal:      General: Bowel sounds are normal. There is no distension.      Palpations: Abdomen is soft. There is no mass.      Tenderness: There is no abdominal tenderness. There is no guarding or rebound.   Musculoskeletal:         General: No swelling or tenderness. Normal range of motion.      Cervical back: Normal range of motion and neck supple. No rigidity.   Skin:     General: Skin is warm and dry.      Capillary Refill: Capillary refill takes  less than 2 seconds.      Findings: No rash.   Neurological:      General: No focal deficit present.      Mental Status: She is alert and oriented to person, place, and time.      GCS: GCS eye subscore is 4. GCS verbal subscore is 5. GCS motor subscore is 6.      Cranial Nerves: Cranial nerves are intact. No cranial nerve deficit, dysarthria or facial asymmetry.      Sensory: Sensation is intact. No sensory deficit.      Motor: Motor function is intact. No weakness, abnormal muscle tone or pronator drift.      Coordination: Coordination is intact. Finger-Nose-Finger Test normal.      Gait: Gait is intact.      Comments: 5 out of 5 strength in all 4 extremities bilaterally.  Sensation intact to light touch in all 4 extremities and the face bilaterally.  Cranial nerves II through XII are intact.  No nystagmus.   Psychiatric:         Mood and Affect: Mood normal.       ED Course     ED Course as of 06/05/22 2051   Sun May 01, 2022   1138 Last name spelled incorrectly - correct name is Philipp June     Procedures            EKG Interpretation:      Interpreted by Nisreen Perry MD  Time reviewed: 11:40 am  Symptoms at time of EKG: pre-syncope   Rhythm: normal sinus   Rate: Normal  Axis: Normal  Ectopy: none  Conduction: normal  ST Segments/ T Waves: Non-specific ST-T wave changes  Q Waves: none  Comparison to prior: Unchanged    Clinical Impression: no acute changes.  No signs of Brugada syndrome, WPW.  Possible mild LVH that has been there previously.              The medical record was reviewed and interpreted.  Current labs reviewed and interpreted.  EKG reviewed and interpreted: as above.  Managed outpatient prescription medications.              Results for orders placed or performed during the hospital encounter of 05/01/22   iStat Gases Electrolytes ICA Glucose Venous, POCT     Status: Abnormal   Result Value Ref Range    CPB Applied No     Hematocrit POCT 39 35 - 47 %    Calcium, Ionized Whole Blood POCT  5.1 4.4 - 5.2 mg/dL    Glucose Whole Blood POCT 102 (H) 70 - 99 mg/dL    Bicarbonate Venous POCT 29 (H) 21 - 28 mmol/L    Hemoglobin POCT 13.3 11.7 - 15.7 g/dL    Potassium POCT 2.7 (L) 3.4 - 5.3 mmol/L    Sodium POCT 141 133 - 144 mmol/L    pCO2 Venous POCT 47 40 - 50 mm Hg    pO2 Venous POCT 18 (L) 25 - 47 mm Hg    pH Venous POCT 7.40 7.32 - 7.43    O2 Sat, Venous POCT 25 (L) 94 - 100 %   HCG qualitative urine (UPT)     Status: Normal   Result Value Ref Range    hCG Urine Qualitative Negative Negative   Basic metabolic panel     Status: Abnormal   Result Value Ref Range    Sodium 139 133 - 144 mmol/L    Potassium 2.5 (LL) 3.4 - 5.3 mmol/L    Chloride 104 94 - 109 mmol/L    Carbon Dioxide (CO2) 29 20 - 32 mmol/L    Anion Gap 6 3 - 14 mmol/L    Urea Nitrogen 14 7 - 30 mg/dL    Creatinine 0.77 0.52 - 1.04 mg/dL    Calcium 9.1 8.5 - 10.1 mg/dL    Glucose 86 70 - 99 mg/dL    GFR Estimate >90 >60 mL/min/1.73m2   Magnesium     Status: Normal   Result Value Ref Range    Magnesium 1.9 1.6 - 2.3 mg/dL   CBC with platelets and differential     Status: None   Result Value Ref Range    WBC Count 5.7 4.0 - 11.0 10e3/uL    RBC Count 4.36 3.80 - 5.20 10e6/uL    Hemoglobin 13.0 11.7 - 15.7 g/dL    Hematocrit 38.3 35.0 - 47.0 %    MCV 88 78 - 100 fL    MCH 29.8 26.5 - 33.0 pg    MCHC 33.9 31.5 - 36.5 g/dL    RDW 13.4 10.0 - 15.0 %    Platelet Count 240 150 - 450 10e3/uL    % Neutrophils 58 %    % Lymphocytes 31 %    % Monocytes 8 %    % Eosinophils 2 %    % Basophils 1 %    % Immature Granulocytes 0 %    NRBCs per 100 WBC 0 <1 /100    Absolute Neutrophils 3.3 1.6 - 8.3 10e3/uL    Absolute Lymphocytes 1.8 0.8 - 5.3 10e3/uL    Absolute Monocytes 0.5 0.0 - 1.3 10e3/uL    Absolute Eosinophils 0.1 0.0 - 0.7 10e3/uL    Absolute Basophils 0.0 0.0 - 0.2 10e3/uL    Absolute Immature Granulocytes 0.0 <=0.4 10e3/uL    Absolute NRBCs 0.0 10e3/uL   Troponin I     Status: Normal   Result Value Ref Range    Troponin I High Sensitivity 4 <54 ng/L    Potassium     Status: Abnormal   Result Value Ref Range    Potassium 2.9 (L) 3.4 - 5.3 mmol/L   Troponin I     Status: Normal   Result Value Ref Range    Troponin I High Sensitivity 6 <54 ng/L   EKG 12 lead     Status: None   Result Value Ref Range    Systolic Blood Pressure  mmHg    Diastolic Blood Pressure  mmHg    Ventricular Rate 74 BPM    Atrial Rate 74 BPM    ME Interval 174 ms    QRS Duration 86 ms     ms    QTc 426 ms    P Axis 60 degrees    R AXIS 32 degrees    T Axis -4 degrees    Interpretation ECG       Sinus rhythm  Possible Left atrial enlargement  T wave abnormality, consider anterior ischemia  Abnormal ECG  Unconfirmed report - interpretation of this ECG is computer generated - see medical record for final interpretation  Confirmed by - EMERGENCY ROOM, PHYSICIAN ([id number]),  MICHAEL KEY ([id number]) on 5/1/2022 4:49:45 PM     CBC with platelets differential     Status: None    Narrative    The following orders were created for panel order CBC with platelets differential.  Procedure                               Abnormality         Status                     ---------                               -----------         ------                     CBC with platelets and d...[[id number]]                      Final result                 Please view results for these tests on the individual orders.     Medications   0.9% sodium chloride BOLUS (0 mLs Intravenous Stopped 5/1/22 1227)   potassium chloride ER (KLOR-CON M) CR tablet 40 mEq (40 mEq Oral Given 5/1/22 1244)   potassium chloride 10 mEq in 100 mL sterile water intermittent infusion (premix) (0 mEq Intravenous Stopped 5/1/22 1351)   potassium chloride ER (KLOR-CON M) CR tablet 10 mEq (10 mEq Oral Given 5/1/22 1542)        Assessments & Plan (with Medical Decision Making)   Patient presents with presyncopal episode.  She did not fully lose consciousness.  She did admit that she had not eaten breakfast or drink very much today.  Considered  vasovagal presyncope versus possible cardiac arrhythmia, etc.  Initially had denied any medical history however once we got her appropriately spelled her name chart we did note that she had seen cardiology and had an echo that showed some mild LVH but was otherwise normal and has had a previous EKG that had shown the mild LVH thus getting the echo.  She also had been prescribed medication for hypertension.  She was previously on metoprolol and potassium and was recently switched to losartan HCTZ and told to stop the potassium.  We had discussed just obtaining an i-STAT labs as the patient was now asymptomatic and feeling entirely back to normal.  However the i-STAT CG 8 revealed a potassium of 2.7 and thus discussed with the patient that we needed to obtain full labs.  Glucose was 102.  She remains asymptomatic here.  No focal neurologic deficits.  No chest pain or shortness of breath and did not have this prior to the event.  She is mildly hypertensive here.  Was given IV fluids.  She is eating and drinking here.  Formal potassium was 2.5.  The remainder of the BMP was unremarkable with normal kidney function.  Pregnancy test negative.  Magnesium within normal limits.  CBC reveals a normal blood cell count of 5.7 with hemoglobin of 13.  Troponin is within normal limits x2 and EKG obtained here is unchanged from prior without any sign of acute ischemia.  No sign of WPW or Brugada syndrome.  Potassium was replaced with 40 mEq orally as well as 10 mEq IV.  She was given additional 10 mEq orally here after her repeat potassium was 2.9.  Discussed the patient with cardiology.  They recommend that I place her back on her 10 mill equivalent potassium chloride as an outpatient as she already has repeat labs planned for Friday.  Patient was in agreement with this plan.  She was very anxious for discharge and continued to be asymptomatic.  Cardiology felt that her recent echo was reassuring and she did not warrant any  further emergent work-up and they can follow-up with her as an outpatient.  She has not had any arrhythmias here.  No further symptoms here.  Patient was in agreement with this plan.  She was given indications for return to the emergency department.  She voiced understanding and was comfortable with this plan.  She discharged home in stable condition.    I have reviewed the nursing notes. I have reviewed the findings, diagnosis, plan and need for follow up with the patient.    Discharge Medication List as of 5/1/2022  3:43 PM      START taking these medications    Details   potassium chloride ER (KLOR-CON M) 10 MEQ CR tablet Take 1 tablet (10 mEq) by mouth daily for 14 days, Disp-14 tablet, R-0, E-Prescribe             Final diagnoses:   Pre-syncope   Hypokalemia       --  Nisreen Perry MD  [facility] EMERGENCY DEPARTMENT  5/1/2022     Nisreen Perry MD  06/05/22 2666

## 2022-05-06 ENCOUNTER — LAB (OUTPATIENT)
Dept: LAB | Facility: CLINIC | Age: 27
End: 2022-05-06
Payer: COMMERCIAL

## 2022-05-06 DIAGNOSIS — I10 HYPERTENSION GOAL BP (BLOOD PRESSURE) < 140/90: Primary | ICD-10-CM

## 2022-05-06 DIAGNOSIS — I10 HYPERTENSION GOAL BP (BLOOD PRESSURE) < 140/90: ICD-10-CM

## 2022-05-06 LAB
ANION GAP SERPL CALCULATED.3IONS-SCNC: 8 MMOL/L (ref 3–14)
BUN SERPL-MCNC: 10 MG/DL (ref 7–30)
CALCIUM SERPL-MCNC: 9.1 MG/DL (ref 8.5–10.1)
CHLORIDE BLD-SCNC: 106 MMOL/L (ref 94–109)
CO2 SERPL-SCNC: 27 MMOL/L (ref 20–32)
CREAT SERPL-MCNC: 0.66 MG/DL (ref 0.52–1.04)
GFR SERPL CREATININE-BSD FRML MDRD: >90 ML/MIN/1.73M2
GLUCOSE BLD-MCNC: 102 MG/DL (ref 70–99)
POTASSIUM BLD-SCNC: 2.7 MMOL/L (ref 3.4–5.3)
SODIUM SERPL-SCNC: 141 MMOL/L (ref 133–144)

## 2022-05-06 PROCEDURE — 80048 BASIC METABOLIC PNL TOTAL CA: CPT

## 2022-05-06 PROCEDURE — 36415 COLL VENOUS BLD VENIPUNCTURE: CPT

## 2022-05-06 RX ORDER — POTASSIUM CHLORIDE 1500 MG/1
20 TABLET, EXTENDED RELEASE ORAL DAILY
Qty: 30 TABLET | Refills: 3 | Status: SHIPPED | OUTPATIENT
Start: 2022-05-06 | End: 2022-05-12

## 2022-05-09 ENCOUNTER — MYC MEDICAL ADVICE (OUTPATIENT)
Dept: UROLOGY | Facility: CLINIC | Age: 27
End: 2022-05-09
Payer: COMMERCIAL

## 2022-05-11 ENCOUNTER — LAB (OUTPATIENT)
Dept: LAB | Facility: CLINIC | Age: 27
End: 2022-05-11
Payer: COMMERCIAL

## 2022-05-11 DIAGNOSIS — I10 HYPERTENSION GOAL BP (BLOOD PRESSURE) < 140/90: ICD-10-CM

## 2022-05-11 LAB
ANION GAP SERPL CALCULATED.3IONS-SCNC: 5 MMOL/L (ref 3–14)
BUN SERPL-MCNC: 7 MG/DL (ref 7–30)
CALCIUM SERPL-MCNC: 9 MG/DL (ref 8.5–10.1)
CHLORIDE BLD-SCNC: 106 MMOL/L (ref 94–109)
CO2 SERPL-SCNC: 30 MMOL/L (ref 20–32)
CREAT SERPL-MCNC: 0.61 MG/DL (ref 0.52–1.04)
GFR SERPL CREATININE-BSD FRML MDRD: >90 ML/MIN/1.73M2
GLUCOSE BLD-MCNC: 90 MG/DL (ref 70–99)
POTASSIUM BLD-SCNC: 2.8 MMOL/L (ref 3.4–5.3)
SODIUM SERPL-SCNC: 141 MMOL/L (ref 133–144)

## 2022-05-11 PROCEDURE — 36415 COLL VENOUS BLD VENIPUNCTURE: CPT

## 2022-05-11 PROCEDURE — 80048 BASIC METABOLIC PNL TOTAL CA: CPT

## 2022-05-12 ENCOUNTER — TELEPHONE (OUTPATIENT)
Dept: CARDIOLOGY | Facility: CLINIC | Age: 27
End: 2022-05-12

## 2022-05-12 DIAGNOSIS — I10 HYPERTENSION GOAL BP (BLOOD PRESSURE) < 140/90: ICD-10-CM

## 2022-05-12 DIAGNOSIS — I10 HYPERTENSION GOAL BP (BLOOD PRESSURE) < 140/90: Primary | ICD-10-CM

## 2022-05-12 DIAGNOSIS — Z86.39 HISTORY OF LOW POTASSIUM: Primary | ICD-10-CM

## 2022-05-12 RX ORDER — LOSARTAN POTASSIUM 100 MG/1
100 TABLET ORAL DAILY
Qty: 90 TABLET | Refills: 3 | Status: SHIPPED | OUTPATIENT
Start: 2022-05-12 | End: 2023-05-24

## 2022-05-12 RX ORDER — POTASSIUM CHLORIDE 1500 MG/1
40 TABLET, EXTENDED RELEASE ORAL 2 TIMES DAILY
Qty: 360 TABLET | Refills: 3 | Status: SHIPPED | OUTPATIENT
Start: 2022-05-12 | End: 2022-06-03

## 2022-05-12 NOTE — RESULT ENCOUNTER NOTE
Potassium continues to be very low.   Take 40 meq twice daily.   Stop losartan-hydrochlorothiazide pill. Start losartan 100 mg every day (will send prescription)  Recheck K lab on Monday. I ordered another blood test to investigate the cause of low potassium. Have the lab send that too when you go on Monday.     Dr Fuchs

## 2022-05-12 NOTE — TELEPHONE ENCOUNTER
Jitendra Fuchs MD  P [facility] Cardiology Adult [place]  Potassium continues to be very low.   Take 40 meq twice daily.   Stop losartan-hydrochlorothiazide pill. Start losartan 100 mg every day (will send prescription)   Recheck K lab on Monday. I ordered another blood test to investigate the cause of low potassium. Have the lab send that too when you go on Monday.       Called and spoke to patient. Reviewed note above from Dr Fuchs. New directions sent to pharmacy. Lab appointment made for May 16 th

## 2022-05-16 ENCOUNTER — LAB (OUTPATIENT)
Dept: LAB | Facility: CLINIC | Age: 27
End: 2022-05-16
Payer: COMMERCIAL

## 2022-05-16 DIAGNOSIS — Z86.39 HISTORY OF LOW POTASSIUM: ICD-10-CM

## 2022-05-16 DIAGNOSIS — I10 HYPERTENSION GOAL BP (BLOOD PRESSURE) < 140/90: ICD-10-CM

## 2022-05-16 LAB
ANION GAP SERPL CALCULATED.3IONS-SCNC: 6 MMOL/L (ref 3–14)
BUN SERPL-MCNC: 9 MG/DL (ref 7–30)
CALCIUM SERPL-MCNC: 9.3 MG/DL (ref 8.5–10.1)
CHLORIDE BLD-SCNC: 108 MMOL/L (ref 94–109)
CO2 SERPL-SCNC: 29 MMOL/L (ref 20–32)
CREAT SERPL-MCNC: 0.68 MG/DL (ref 0.52–1.04)
GFR SERPL CREATININE-BSD FRML MDRD: >90 ML/MIN/1.73M2
GLUCOSE BLD-MCNC: 85 MG/DL (ref 70–99)
POTASSIUM BLD-SCNC: 3.2 MMOL/L (ref 3.4–5.3)
SODIUM SERPL-SCNC: 143 MMOL/L (ref 133–144)

## 2022-05-16 PROCEDURE — 36415 COLL VENOUS BLD VENIPUNCTURE: CPT

## 2022-05-16 PROCEDURE — 80048 BASIC METABOLIC PNL TOTAL CA: CPT

## 2022-05-17 ENCOUNTER — TELEPHONE (OUTPATIENT)
Dept: CARDIOLOGY | Facility: CLINIC | Age: 27
End: 2022-05-17
Payer: COMMERCIAL

## 2022-05-17 DIAGNOSIS — Z86.39 HISTORY OF LOW POTASSIUM: Primary | ICD-10-CM

## 2022-05-17 NOTE — TELEPHONE ENCOUNTER
Jitendra Fuchs MD  P [facility] Cardiology Adult [place]  Please call her with the following:   Potassium continues to be low, increase to 80 in the morning and 40 in the evening.  Will likely need a new prescription, can give 40 mEq pills.   Renin and aldosterone looks like was still not drawn, not sure why.  Please check with lab, and have patient go back for the blood draw.  Also, lets recheck potassium after 1 week.   Please check her blood pressure at home and that she is taking losartan 100 mg.       Called and spoke to lab. They missed the order for renin and aldosterone, and it was incorrectly order, but she said they should have caught the error.   Entered the correct lab order codes     Called and spoke to patient. Reviewed Dr Fuchs's plan. However when I reviewed the increase she said she has only been taking two tablets daily, not four tablets daily per 5/12/22 note. She misunderstood.   Asked her to increase her potassium to two tablets BID and will call her back with new recommendations   She cannot come back for the lab draw until Friday, so scheduled for Monday so she can have the potassium rechecked at the same time.     Missy Weir, RN  Cardiology Care Coordinator  [facility] [place]  Phone: [phone]

## 2022-05-17 NOTE — RESULT ENCOUNTER NOTE
Please call her with the following:  Potassium continues to be low, increase to 80 in the morning and 40 in the evening.  Will likely need a new prescription, can give 40 mEq pills.  Renin and aldosterone looks like was still not drawn, not sure why.  Please check with lab, and have patient go back for the blood draw.  Also, lets recheck potassium after 1 week.  Please check her blood pressure at home and that she is taking losartan 100 mg.

## 2022-05-22 NOTE — TELEPHONE ENCOUNTER
Jitendra Fuchs MD Balcome, Missy BAPTISTE, RN  Cc: P [facility] Cardiology Adult [place]  Caller: Unspecified (Yesterday,  4:02 PM)  Thanks. 40 bid is good then.     Called and spoke to patient regarding Dr Fuchs's above recommendation    Missy Weir, RN  Cardiology Care Coordinator  [facility]  Phone: [phone]     Clothing

## 2022-05-24 ENCOUNTER — LAB (OUTPATIENT)
Dept: LAB | Facility: CLINIC | Age: 27
End: 2022-05-24
Payer: COMMERCIAL

## 2022-05-24 DIAGNOSIS — Z86.39 HISTORY OF LOW POTASSIUM: ICD-10-CM

## 2022-05-24 LAB — POTASSIUM BLD-SCNC: 3.5 MMOL/L (ref 3.4–5.3)

## 2022-05-24 PROCEDURE — 36415 COLL VENOUS BLD VENIPUNCTURE: CPT

## 2022-05-24 PROCEDURE — 82088 ASSAY OF ALDOSTERONE: CPT

## 2022-05-24 PROCEDURE — 84132 ASSAY OF SERUM POTASSIUM: CPT

## 2022-05-24 PROCEDURE — 84244 ASSAY OF RENIN: CPT | Mod: 90

## 2022-05-25 NOTE — RESULT ENCOUNTER NOTE
Potassium is improved, continue current supplementation.   No change in clinical plan. Follow up as recommended.     Dr Fuchs

## 2022-05-27 LAB — ALDOST SERPL-MCNC: 59.6 NG/DL (ref 0–31)

## 2022-05-29 LAB — RENIN PLAS-CCNC: 0.4 NG/ML/HR

## 2022-05-30 DIAGNOSIS — I10 HYPERTENSION GOAL BP (BLOOD PRESSURE) < 140/90: ICD-10-CM

## 2022-05-31 DIAGNOSIS — E26.9 HYPERALDOSTERONISM (H): ICD-10-CM

## 2022-05-31 DIAGNOSIS — I10 HYPERTENSION GOAL BP (BLOOD PRESSURE) < 140/90: Primary | ICD-10-CM

## 2022-06-01 NOTE — TELEPHONE ENCOUNTER
RECORDS RECEIVED FROM: internal    DATE RECEIVED: 6.23.22    NOTES (FOR ALL VISITS) STATUS DETAILS   OFFICE NOTES from referring provider internal  Jitendra Fuchs MD   OFFICE NOTES from other specialist internal  2.11.22 [place]      ED NOTES     OPERATIVE REPORT  (thyroid, pituitary, adrenal, parathyroid)     MEDICATION LIST internal       IMAGING        LABS     DIABETES: HBGA1C, CREATININE, FASTING LIPIDS, MICROALBUMIN URINE, POTASSIUM, TSH, T4  THYROID: TSH, T4, CBC, THYRODLONULIN, TOTAL T3, FREE T4, CALCITONIN, CEA internal  Cbc- 5.1.22  Lipid- 8.30.21  Potassium- 5.24.22  TSH/T4- 10.5.21  T3- 10.5.21  Vitamin D Deficiency- 2.11.22

## 2022-06-03 RX ORDER — POTASSIUM CHLORIDE 1500 MG/1
40 TABLET, EXTENDED RELEASE ORAL 2 TIMES DAILY
Qty: 360 TABLET | Refills: 1 | Status: SHIPPED | OUTPATIENT
Start: 2022-06-03 | End: 2023-02-14

## 2022-06-03 NOTE — TELEPHONE ENCOUNTER
"Requested Prescriptions   Pending Prescriptions Disp Refills     potassium chloride ER (KLOR-CON) 20 MEQ CR tablet 360 tablet 1     Sig: Take 2 tablets (40 mEq) by mouth 2 times daily       Potassium Supplements Protocol Passed - 6/3/2022 11:46 AM        Passed - Recent (12 mo) or future (30 days) visit within the authorizing provider's department     Patient has had an office visit with the authorizing provider or a provider within the authorizing providers department within the previous 12 mos or has a future within next 30 days. See \"Patient Info\" tab in inbasket, or \"Choose Columns\" in Meds & Orders section of the refill encounter.              Passed - Medication is active on med list        Passed - Patient is age 18 or older        Passed - Normal serum potassium in past 12 months     Recent Labs   Lab Test 05/24/22  1700   POTASSIUM 3.5                         Per last note, patient should be taking 40 mEq BID of potassium. (note from 5/18/22)    Directions changed and refilled.     Missy Weir RN  Cardiology Care Coordinator  [facility]  Phone: [phone]    "

## 2022-06-03 NOTE — TELEPHONE ENCOUNTER
KLOR-CON M20 TABLET   Please clarify order.   Order in chart and order from pharmacy do not match.   Please advise and send updated order.       Suri Marin RN  Central Triage Red Flags/Med Refills

## 2022-06-20 ASSESSMENT — ENCOUNTER SYMPTOMS: HYPERTENSION: 1

## 2022-06-22 NOTE — PROGRESS NOTES
Subjective:    New patient, 1 prior [facility] Endocrine note from 10/5/2021 (Dr. 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    # Primary aldosteronism        She has had intermittent hypokalemia. First ever labs done 2/2021 showed hypokalemia. GFR normal.     TTE 3/2022: normal LVEF, there is LVH     She was first told she had hypertension at a routine visit sometime in 2021 (systolic 140 - 150) and she doesn't recall exactly when this was, she had routine blood pressure screening prior to that but was never told she had hypertension.     She has been following with Dr. Fuchs, cardiologist, for BP management and potassium supplementation.     Current BP medications:  -Losartan 100 mg daily monotherapy   -Potassium chloride 40 mEq BID    She takes biotin.     No FH of hypertension. No CVD in the family.     # Secondary hyperparathyroidism due to vitamin D deficiency   -No prior hypercalcemia, serum phosphorus previously normal  -10/5/2021: serum calcium mildly low, vitamin D low, PTH mildly elevated   -2/2022: vitamin D 49 (ref range 20 - 75 mcg/L)    She takes vitamin D 5000 international unit(s) daily (stable dose for about 6 months).     She has <1 serving of dairy daily. She does eat green leafy vegetables.     No prior nephrolithiasis. No prior fracture.    # Thyroid function    First time TFT was checked 8/2021 (she was asymptomatic, this was a routine screening test): TSH 0.345 (ref range 0.358 - 4.74), T4/T3 both normal    10/2021: Tg Ab, TSI, TPO all undetectable     Subsequently TSH always normal, most recently checked 10/2021    No prior neck surgery. No prior H/N radiation. No FH of thyroid disease. No known thyroid nodule. No prior thyroid ultrasound.    No tobacco use.    No prior pregnancies. She is planning pregnancy in the near future, but will hold off until we have adequately addressed her PA. She has normal  menses. She is not using hormonal contraception at this time.     No endocrinopathies in the family.     Objective:    /122, BMI 27.69 kg/m2    No Cushingoid features. Sclera white. No thyroid eye disease. Thyroid examined and normal. No cervical LAD. Radial pulse with a regular rate and rhythm. Breathing comfortably on room air.      Assessment/Plan:    # Primary aldosteronism    We will repeat paired aldosterone and PRA and a BMP.    Because of spontaneous hypokalemia, if aldosterone is >= 20 ng/dL with suppressed PRA, this confirms the diagnosis of primary aldosteronism and confirmatory testing is not needed.    Adrenal CT ordered. She denies current pregnancy, will check a pregnancy test before CT to be cautious.     We reviewed adrenal vein sampling. She is interested in surgical cure if aldosterone excess is unilateral and I recommend this over medical management. We'll touch base after labs return to coordinate next steps.     # Secondary hyperparathyroidism due to vitamin D deficiency     Repeat PTH with minerals and vitamin D.     # Thyroid function    TSH ordered. Hold biotin for 3 days before labs.     63 minutes spent on the date of the encounter doing chart review, history and exam, documentation and further activities as noted above.

## 2022-06-23 ENCOUNTER — OFFICE VISIT (OUTPATIENT)
Dept: ENDOCRINOLOGY | Facility: CLINIC | Age: 27
End: 2022-06-23
Payer: COMMERCIAL

## 2022-06-23 ENCOUNTER — PRE VISIT (OUTPATIENT)
Dept: ENDOCRINOLOGY | Facility: CLINIC | Age: 27
End: 2022-06-23

## 2022-06-23 VITALS
HEART RATE: 83 BPM | OXYGEN SATURATION: 97 % | BODY MASS INDEX: 27.6 KG/M2 | SYSTOLIC BLOOD PRESSURE: 154 MMHG | DIASTOLIC BLOOD PRESSURE: 110 MMHG | TEMPERATURE: 98.6 F | WEIGHT: 182.1 LBS | HEIGHT: 68 IN

## 2022-06-23 DIAGNOSIS — E26.9 HYPERALDOSTERONISM (H): ICD-10-CM

## 2022-06-23 DIAGNOSIS — E55.9 VITAMIN D DEFICIENCY: ICD-10-CM

## 2022-06-23 DIAGNOSIS — R94.6 THYROID FUNCTION TEST ABNORMAL: ICD-10-CM

## 2022-06-23 DIAGNOSIS — I10 HYPERTENSION GOAL BP (BLOOD PRESSURE) < 140/90: ICD-10-CM

## 2022-06-23 DIAGNOSIS — E21.3 HYPERPARATHYROIDISM (H): ICD-10-CM

## 2022-06-23 PROCEDURE — 99205 OFFICE O/P NEW HI 60 MIN: CPT | Performed by: INTERNAL MEDICINE

## 2022-06-23 ASSESSMENT — PAIN SCALES - GENERAL: PAINLEVEL: NO PAIN (0)

## 2022-06-23 NOTE — LETTER
6/23/2022       RE: Philipp June  [address] [facility] [address]     Dear Colleague,    Thank you for referring your patient, Philipp June, to the [facility] ENDOCRINOLOGY CLINIC [place] at [facility]. Please see a copy of my visit note below.    Subjective:    New patient, 1 prior OSH Endocrine note from 10/5/2021 (Dr. 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    # Primary aldosteronism        She has had intermittent hypokalemia. First ever labs done 2/2021 showed hypokalemia. GFR normal.     TTE 3/2022: normal LVEF, there is LVH     She was first told she had hypertension at a routine visit sometime in 2021 (systolic 140 - 150) and she doesn't recall exactly when this was, she had routine blood pressure screening prior to that but was never told she had hypertension.     She has been following with Dr. Fuchs, cardiologist, for BP management and potassium supplementation.     Current BP medications:  -Losartan 100 mg daily monotherapy   -Potassium chloride 40 mEq BID    She takes biotin.     No FH of hypertension. No CVD in the family.     # Secondary hyperparathyroidism due to vitamin D deficiency   -No prior hypercalcemia, serum phosphorus previously normal  -10/5/2021: serum calcium mildly low, vitamin D low, PTH mildly elevated   -2/2022: vitamin D 49 (ref range 20 - 75 mcg/L)    She takes vitamin D 5000 international unit(s) daily (stable dose for about 6 months).     She has <1 serving of dairy daily. She does eat green leafy vegetables.     No prior nephrolithiasis. No prior fracture.    # Thyroid function    First time TFT was checked 8/2021 (she was asymptomatic, this was a routine screening test): TSH 0.345 (ref range 0.358 - 4.74), T4/T3 both normal    10/2021: Tg Ab, TSI, TPO all undetectable     Subsequently TSH always normal, most recently  checked 10/2021    No prior neck surgery. No prior H/N radiation. No FH of thyroid disease. No known thyroid nodule. No prior thyroid ultrasound.    No tobacco use.    No prior pregnancies. She is planning pregnancy in the near future, but will hold off until we have adequately addressed her PA. She has normal menses. She is not using hormonal contraception at this time.     No endocrinopathies in the family.     Objective:    /122, BMI 27.69 kg/m2    No Cushingoid features. Sclera white. No thyroid eye disease. Thyroid examined and normal. No cervical LAD. Radial pulse with a regular rate and rhythm. Breathing comfortably on room air.      Assessment/Plan:    # Primary aldosteronism    We will repeat paired aldosterone and PRA and a BMP.    Because of spontaneous hypokalemia, if aldosterone is >= 20 ng/dL with suppressed PRA, this confirms the diagnosis of primary aldosteronism and confirmatory testing is not needed.    Adrenal CT ordered. She denies current pregnancy, will check a pregnancy test before CT to be cautious.     We reviewed adrenal vein sampling. She is interested in surgical cure if aldosterone excess is unilateral and I recommend this over medical management. We'll touch base after labs return to coordinate next steps.     # Secondary hyperparathyroidism due to vitamin D deficiency     Repeat PTH with minerals and vitamin D.     # Thyroid function    TSH ordered. Hold biotin for 3 days before labs.     63 minutes spent on the date of the encounter doing chart review, history and exam, documentation and further activities as noted above.     Sincerely,    Elvis Sanchez MD

## 2022-06-23 NOTE — NURSING NOTE
"Chief Complaint   Patient presents with     Endocrine Problem     Vital signs:  Temp: 98.6  F (37  C) Temp src: Oral BP: (!) 168/122 Pulse: 75     SpO2: 97 %     Height: 172.7 cm (5' 8\") Weight: 82.6 kg (182 lb 1.6 oz)  Estimated body mass index is 27.69 kg/m  as calculated from the following:    Height as of this encounter: 1.727 m (5' 8\").    Weight as of this encounter: 82.6 kg (182 lb 1.6 oz).        "

## 2022-06-28 ENCOUNTER — LAB (OUTPATIENT)
Dept: LAB | Facility: CLINIC | Age: 27
End: 2022-06-28

## 2022-06-28 ENCOUNTER — ANCILLARY PROCEDURE (OUTPATIENT)
Dept: CT IMAGING | Facility: CLINIC | Age: 27
End: 2022-06-28
Attending: INTERNAL MEDICINE
Payer: COMMERCIAL

## 2022-06-28 DIAGNOSIS — E21.3 HYPERPARATHYROIDISM (H): ICD-10-CM

## 2022-06-28 DIAGNOSIS — R94.6 THYROID FUNCTION TEST ABNORMAL: ICD-10-CM

## 2022-06-28 DIAGNOSIS — E26.9 HYPERALDOSTERONISM (H): ICD-10-CM

## 2022-06-28 DIAGNOSIS — I10 HYPERTENSION GOAL BP (BLOOD PRESSURE) < 140/90: ICD-10-CM

## 2022-06-28 DIAGNOSIS — E55.9 VITAMIN D DEFICIENCY: ICD-10-CM

## 2022-06-28 LAB
ALBUMIN SERPL-MCNC: 3.9 G/DL (ref 3.4–5)
ANION GAP SERPL CALCULATED.3IONS-SCNC: 4 MMOL/L (ref 3–14)
BUN SERPL-MCNC: 8 MG/DL (ref 7–30)
CALCIUM SERPL-MCNC: 8.9 MG/DL (ref 8.5–10.1)
CHLORIDE BLD-SCNC: 107 MMOL/L (ref 94–109)
CO2 SERPL-SCNC: 28 MMOL/L (ref 20–32)
CREAT SERPL-MCNC: 0.67 MG/DL (ref 0.52–1.04)
GFR SERPL CREATININE-BSD FRML MDRD: >90 ML/MIN/1.73M2
GLUCOSE BLD-MCNC: 80 MG/DL (ref 70–99)
HCG SERPL QL: NEGATIVE
PHOSPHATE SERPL-MCNC: 2.9 MG/DL (ref 2.5–4.5)
POTASSIUM BLD-SCNC: 3.4 MMOL/L (ref 3.4–5.3)
PTH-INTACT SERPL-MCNC: 83 PG/ML (ref 15–65)
SODIUM SERPL-SCNC: 139 MMOL/L (ref 133–144)
TSH SERPL DL<=0.005 MIU/L-ACNC: 0.76 MU/L (ref 0.4–4)

## 2022-06-28 PROCEDURE — 84703 CHORIONIC GONADOTROPIN ASSAY: CPT | Performed by: PATHOLOGY

## 2022-06-28 PROCEDURE — 36415 COLL VENOUS BLD VENIPUNCTURE: CPT | Performed by: PATHOLOGY

## 2022-06-28 PROCEDURE — 82306 VITAMIN D 25 HYDROXY: CPT | Performed by: PATHOLOGY

## 2022-06-28 PROCEDURE — 84443 ASSAY THYROID STIM HORMONE: CPT | Performed by: PATHOLOGY

## 2022-06-28 PROCEDURE — 84244 ASSAY OF RENIN: CPT | Performed by: PATHOLOGY

## 2022-06-28 PROCEDURE — 74150 CT ABDOMEN W/O CONTRAST: CPT | Mod: GC | Performed by: RADIOLOGY

## 2022-06-28 PROCEDURE — 82088 ASSAY OF ALDOSTERONE: CPT | Performed by: PATHOLOGY

## 2022-06-28 PROCEDURE — 99000 SPECIMEN HANDLING OFFICE-LAB: CPT | Performed by: PATHOLOGY

## 2022-06-28 PROCEDURE — 83970 ASSAY OF PARATHORMONE: CPT | Performed by: PATHOLOGY

## 2022-06-28 PROCEDURE — 80069 RENAL FUNCTION PANEL: CPT | Performed by: PATHOLOGY

## 2022-07-01 LAB
ALDOST SERPL-MCNC: 58.5 NG/DL (ref 0–31)
RENIN PLAS-CCNC: <0.1 NG/ML/HR

## 2022-07-02 LAB
DEPRECATED CALCIDIOL+CALCIFEROL SERPL-MC: <66 UG/L (ref 20–75)
VITAMIN D2 SERPL-MCNC: <5 UG/L
VITAMIN D3 SERPL-MCNC: 61 UG/L

## 2022-07-06 ENCOUNTER — DOCUMENTATION ONLY (OUTPATIENT)
Dept: ENDOCRINOLOGY | Facility: CLINIC | Age: 27
End: 2022-07-06

## 2022-07-06 NOTE — PROGRESS NOTES
# Primary aldosteronism     Because of spontaneous hypokalemia, and because her aldosterone is >= 20 ng/dL with suppressed PRA, this confirms the diagnosis of primary aldosteronism and confirmatory testing is not needed.    CT abdomen 6/28/2022: I reviewed the images and agree with radiology that the adrenal glands appear normal. There was an incidental finding of a small fat-containing umbilical hernia.    Zain and I agree on surgical management if the aldosterone excess is unilateral.    Next step is adrenal vein sampling and I contacted IR to coordinate this.

## 2022-07-07 DIAGNOSIS — E26.9 HYPERALDOSTERONISM (H): Primary | ICD-10-CM

## 2022-07-27 ENCOUNTER — MYC MEDICAL ADVICE (OUTPATIENT)
Dept: FAMILY MEDICINE | Facility: CLINIC | Age: 27
End: 2022-07-27

## 2022-07-27 ENCOUNTER — TELEPHONE (OUTPATIENT)
Dept: FAMILY MEDICINE | Facility: CLINIC | Age: 27
End: 2022-07-27

## 2022-07-27 NOTE — TELEPHONE ENCOUNTER
Patient Quality Outreach    Patient is due for the following:   Cervical Cancer Screening - PAP Needed  Physical  - Now    NEXT STEPS:   will send [employer] message    Type of outreach:    Sent [employer] message.    Next Steps:  Reach out within 90 days via [employer].    Max number of attempts reached: No. Will try again in 90 days if patient still on fail list.    Questions for provider review:    None     Julieta Carrero MA

## 2022-08-05 ENCOUNTER — ANCILLARY PROCEDURE (OUTPATIENT)
Dept: CT IMAGING | Facility: CLINIC | Age: 27
End: 2022-08-05
Attending: RADIOLOGY
Payer: COMMERCIAL

## 2022-08-05 DIAGNOSIS — E26.9 HYPERALDOSTERONISM (H): ICD-10-CM

## 2022-08-05 PROCEDURE — 74177 CT ABD & PELVIS W/CONTRAST: CPT | Mod: GC | Performed by: RADIOLOGY

## 2022-08-05 RX ORDER — IOPAMIDOL 755 MG/ML
102 INJECTION, SOLUTION INTRAVASCULAR ONCE
Status: COMPLETED | OUTPATIENT
Start: 2022-08-05 | End: 2022-08-05

## 2022-08-05 RX ADMIN — IOPAMIDOL 102 ML: 755 INJECTION, SOLUTION INTRAVASCULAR at 10:11

## 2022-08-05 NOTE — TELEPHONE ENCOUNTER
DIAGNOSIS: NEW AVS  ref by Dr. Sanchez   DATE RECEIVED: 8.24.22   NOTES STATUS DETAILS   OFFICE NOTE from referring provider Internal 7.7.22, 6.23.22  Daniel Lopez   MEDICATION LIST Internal    XRAYS (IMAGES & REPORTS) Internal 8.5.22  CT Abd/Pelvis    6.28.22  CT Abd    2.18.22  US Renal Complete

## 2022-08-24 ENCOUNTER — VIRTUAL VISIT (OUTPATIENT)
Dept: RADIOLOGY | Facility: CLINIC | Age: 27
End: 2022-08-24
Attending: RADIOLOGY
Payer: COMMERCIAL

## 2022-08-24 ENCOUNTER — PRE VISIT (OUTPATIENT)
Dept: RADIOLOGY | Facility: CLINIC | Age: 27
End: 2022-08-24

## 2022-08-24 DIAGNOSIS — E26.9 HYPERALDOSTERONISM (H): Primary | ICD-10-CM

## 2022-08-24 PROCEDURE — G0463 HOSPITAL OUTPT CLINIC VISIT: HCPCS | Mod: PN,RTG | Performed by: RADIOLOGY

## 2022-08-24 PROCEDURE — 99203 OFFICE O/P NEW LOW 30 MIN: CPT | Mod: 95 | Performed by: RADIOLOGY

## 2022-08-24 NOTE — LETTER
8/24/2022         RE: Philipp June  [address] [facility] [address]        Dear Colleague,    Thank you for referring your patient, Philipp June, to the [facility]. Please see a copy of my visit note below.    Zain is a 26 year old who is being evaluated via a billable video visit.      Patient stated she is in the state of [place] for the visit today.    How would you like to obtain your AVS? MyChart  If the video visit is dropped, the invitation should be resent by: Text to cell phone: [phone]  Will anyone else be joining your video visit? No      Video-Visit Details    Video Start Time: 9:52 AM    Type of service:  Video Visit    Video End Time:10:09 AM    Originating Location (pt. Location): Work    Distant Location (provider location):  [facility]     Platform used for Video Visit: Lydia Jj, Virtual Visit Facilitator        INTERVENTIONAL RADIOLOGY CONSULTATION    Name: Philipp Jnue  Age: 26 year old   Referring Physician: Dr. Elvis Sanchez   REASON FOR REFERRAL: Primary aldosteronism, hypetension     HPI: Ms. June is referred by Dr. Elvis Sanchez to discuss adrenal vein sampling.     She was first diagnosed with HTN 12 months ago. Initially, she was told to manage with diet/salt restriction, etc. After that, she was having headaches and chest pain and her HTN worsened.  She was sybsequently found to have high serum aldosterone. She was started on antihypertensive medications and CP and headaches improved.    She does BP checks at home, most recently upper 130s-140s/100s. She is currently on losartan 100 mg qd.  She does not take a mineralocorticoid receptor antagonist.  She is hypokalemic and takes K (klorcon 40 mEq BID) supplement and most recent K on 6/28/2022 was 3.4.    No hx HTN crisis or emergency, MI or stroke. She did have a syncopal episode in May 2022, and in the ED an EKG suggested LVH but no e/o  HAYLIE/STD or prior MI.     No fever, dypnea, cough, n/v, abdomen, hematuria, pain with urination, lower extremity edema.    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    PAST MEDICAL HISTORY:   Past Medical History:   Diagnosis Date     Benign essential hypertension        PAST SURGICAL HISTORY:   No past surgical history on file.    FAMILY HISTORY:   No family history on file.    SOCIAL HISTORY:   Social History     Tobacco Use     Smoking status: Never Smoker     Smokeless tobacco: Never Used   Substance Use Topics     Alcohol use: Never       PROBLEM LIST:   Patient Active Problem List    Diagnosis Date Noted     Hyperparathyroidism due to vitamin D deficiency (H) 10/14/2021     Priority: Medium     Vitamin D deficiency 10/05/2021     Priority: Medium     Abnormal results of thyroid function studies 10/05/2021     Priority: Medium     Myopia of both eyes with astigmatism 04/22/2019     Priority: Medium     Formatting of this note might be different from the original.  Last Assessment & Plan:   Formatting of this note might be different from the original.  Refractive Error  - Healthy ocular structures with mild refractive error   - Due to the patient's absence of visual complaints, will defer dispensing glasses Rx. Her uncorrected visual acuity is 20/25-2 in right eye and 20/25-3 in left eye.   - Return in 1 year for CEE       Dry eyes 04/22/2019     Priority: Medium     Formatting of this note might be different from the original.  Last Assessment & Plan:   Formatting of this note might be different from the original.  - Dry eyes bilaterally with MGD  - No signs of inflammation or infection  - Start artificial tear drops QID and PRN   - Monitor; to return if symptoms do not improve as anticipated       Redness of both eyes 03/27/2019     Priority: Medium     Formatting of this note might be different from the original.  Last Assessment & Plan:   Formatting of this  note might be different from the original.  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     Priority: Medium     Palpitation 12/31/2018     Priority: Medium       MEDICATIONS:   Prescription Medications as of 8/24/2022       Rx Number Disp Refills Start End Last Dispensed Date Next Fill Date Owning Pharmacy    biotin 1000 MCG TABS tablet            Sig: Take 1,000 mcg by mouth every other day    Class: Historical    Route: Oral    cholecalciferol (VITAMIN D3) 125 mcg (5000 units) capsule    10/18/2021        Class: Historical    losartan (COZAAR) 100 MG tablet  90 tablet 3 5/12/2022    [facility]/pharmacy #[id number] [address]    Sig: Take 1 tablet (100 mg) by mouth daily    Class: E-Prescribe    Route: Oral    potassium chloride ER (KLOR-CON) 20 MEQ CR tablet  360 tablet 1 6/3/2022    [facility]/pharmacy #[id number] - [address]    Sig: Take 2 tablets (40 mEq) by mouth 2 times daily    Class: E-Prescribe    Notes to Pharmacy: Dose increase    Route: Oral          ALLERGIES:   Patient has no known allergies.    ROS:      Physical Examination:   VITALS:   There were no vitals taken for this visit.  GENERAL: Healthy, alert and no distress  SKIN: Visible skin clear. No significant rash, abnormal pigmentation or lesions.  PSYCH: Mentation appears normal, affect normal/bright, judgement and insight intact, normal speech and appearance well-groomed.  EYES: Eyes grossly normal to inspection. No discharge or erythema, or obvious scleral/conjunctival abnormalities.  RESP: No audible wheeze, cough, or visible cyanosis. No visible retractions or increased work of breathing.   NEURO: Cranial nerves grossly  intact. Mentation and speech appropriate for age.      Labs:    BMP RESULTS:  Lab Results   Component Value Date     06/28/2022    POTASSIUM 3.4 06/28/2022    CHLORIDE 107 06/28/2022    CO2 28 06/28/2022    ANIONGAP 4 06/28/2022    GLC 80 06/28/2022    BUN 8 06/28/2022    CR 0.67 06/28/2022    GFRESTIMATED >90 06/28/2022    DEACON 8.9 06/28/2022        CBC RESULTS:  Lab Results   Component Value Date    WBC 5.7 05/01/2022    RBC 4.36 05/01/2022    HGB 13.0 05/01/2022    HGB 13.3 05/01/2022    HCT 38.3 05/01/2022    HCT 39 05/01/2022    MCV 88 05/01/2022    MCH 29.8 05/01/2022    MCHC 33.9 05/01/2022    RDW 13.4 05/01/2022     05/01/2022       INR/PTT:  No results found for: INR, PTT    Diagnostic studies:   Imaging personally reviewed by me.  CT on 8/5/2022 demonstrates no discernible adrenal nodule.  The right adrenal vein was not identified.  The left adrenal vein was identified.    Radiologist interpretation:  IMPRESSION:   1. The left adrenal vein measures up to 5 mm in diameter and enters  the superior aspect of the left renal vein approximately 1.7 cm from  the IVC.  2. The right adrenal vein is not definitively identified.    Assessment: 26-year-old female with hypertension and primary aldosteronism.  She is appropriate for adrenal vein sampling. I explained that if a single adrenal gland is responsible for his hyperaldosteronism, a adrenalectomy may be considered in the future, directed by the referring clinician. I discussed the process of adrenal vein sampling, which is an outpatient procedure done under moderate sedation.  I discussed the technical details and challenges, and discussed the risks of access site bleeding, vascular injury, adrenal vein rupture, adrenal hematoma, technical failure, and despite catheterization of the adrenal veins there can be a challenge achieving adequate volume blood sampling with small veins, and repeat sampling may be needed.  However, this is a rarity in my  practice.  She would like to proceed      Plan:   1. Plan for adrenal vein sampling.   2.  Prescription for rapidly absorbing liquid potassium was provided for him to take 1 hour prior to presenting to check in on the day of procedure. This will reduce the rneed to replace any potassium while on 2A immediately prior to the procedure.    It was a pleasure to conduct this video visit with Ms. Slade mooney.  Thank you for involving the interventional radiology service in her care.    I spent a total of 17 minutes face-to-face time on today's video visit, over 50% time was for counseling and care coordination.  In addition I spent 10 minutes reviewing imaging and 10 minutes completing documentation.    Wen Maher MD  Interventional Radiology   Pager [phone]        Hyperaldosteronism (H)  - IR Adrenal Venogram Bilateral; Future     Review of the result(s) of each unique test - CT  Independent interpretation of a test performed by another physician/other qualified health care professional (not separately reported) - CT          Again, thank you for allowing me to participate in the care of your patient.      Sincerely,    Wen Maher MD

## 2022-08-24 NOTE — NURSING NOTE
Patient verified medications and allergies are correct via eCheck-in. Patient confirms no changes at this time and/or since last reviewed by clinic staff.    Candice Jj, Virtual Facilitator

## 2022-08-24 NOTE — PROGRESS NOTES
Zain is a 26 year old who is being evaluated via a billable video visit.      Patient stated she is in the state of [place] for the visit today.    How would you like to obtain your AVS? MyChart  If the video visit is dropped, the invitation should be resent by: Text to cell phone: [phone]  Will anyone else be joining your video visit? No      Video-Visit Details    Video Start Time: 9:52 AM    Type of service:  Video Visit    Video End Time:10:09 AM    Originating Location (pt. Location): Work    Distant Location (provider location):  [facility]     Platform used for Video Visit: Lydia Jj, Virtual Visit Facilitator        INTERVENTIONAL RADIOLOGY CONSULTATION    Name: Philipp June  Age: 26 year old   Referring Physician: Dr. Elvis Sanchez   REASON FOR REFERRAL: Primary aldosteronism, hypetension     HPI: Ms. June is referred by Dr. Elvis Sanchez to discuss adrenal vein sampling.     She was first diagnosed with HTN 12 months ago. Initially, she was told to manage with diet/salt restriction, etc. After that, she was having headaches and chest pain and her HTN worsened.  She was sybsequently found to have high serum aldosterone. She was started on antihypertensive medications and CP and headaches improved.    She does BP checks at home, most recently upper 130s-140s/100s. She is currently on losartan 100 mg qd.  She does not take a mineralocorticoid receptor antagonist.  She is hypokalemic and takes K (klorcon 40 mEq BID) supplement and most recent K on 6/28/2022 was 3.4.    No hx HTN crisis or emergency, MI or stroke. She did have a syncopal episode in May 2022, and in the ED an EKG suggested LVH but no e/o HAYLIE/STD or prior MI.     No fever, dypnea, cough, n/v, abdomen, hematuria, pain with urination, lower extremity edema.    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    PAST MEDICAL HISTORY:   Past Medical  History:   Diagnosis Date     Benign essential hypertension        PAST SURGICAL HISTORY:   No past surgical history on file.    FAMILY HISTORY:   No family history on file.    SOCIAL HISTORY:   Social History     Tobacco Use     Smoking status: Never Smoker     Smokeless tobacco: Never Used   Substance Use Topics     Alcohol use: Never       PROBLEM LIST:   Patient Active Problem List    Diagnosis Date Noted     Hyperparathyroidism due to vitamin D deficiency (H) 10/14/2021     Priority: Medium     Vitamin D deficiency 10/05/2021     Priority: Medium     Abnormal results of thyroid function studies 10/05/2021     Priority: Medium     Myopia of both eyes with astigmatism 04/22/2019     Priority: Medium     Formatting of this note might be different from the original.  Last Assessment & Plan:   Formatting of this note might be different from the original.  Refractive Error  - Healthy ocular structures with mild refractive error   - Due to the patient's absence of visual complaints, will defer dispensing glasses Rx. Her uncorrected visual acuity is 20/25-2 in right eye and 20/25-3 in left eye.   - Return in 1 year for CEE       Dry eyes 04/22/2019     Priority: Medium     Formatting of this note might be different from the original.  Last Assessment & Plan:   Formatting of this note might be different from the original.  - Dry eyes bilaterally with MGD  - No signs of inflammation or infection  - Start artificial tear drops QID and PRN   - Monitor; to return if symptoms do not improve as anticipated       Redness of both eyes 03/27/2019     Priority: Medium     Formatting of this note might be different from the original.  Last Assessment & Plan:   Formatting of this note might be different from the original.  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     Priority: Medium     Palpitation 12/31/2018     Priority: Medium       MEDICATIONS:   Prescription Medications as of 8/24/2022       Rx Number Disp Refills Start End Last Dispensed Date Next Fill Date Owning Pharmacy    biotin 1000 MCG TABS tablet            Sig: Take 1,000 mcg by mouth every other day    Class: Historical    Route: Oral    cholecalciferol (VITAMIN D3) 125 mcg (5000 units) capsule    10/18/2021        Class: Historical    losartan (COZAAR) 100 MG tablet  90 tablet 3 5/12/2022    [facility]pharm[facility] #[id number] - [place], [address]    Sig: Take 1 tablet (100 mg) by mouth daily    Class: E-Prescribe    Route: Oral    potassium chloride ER (KLOR-CON) 20 MEQ CR tablet  360 tablet 1 6/3/2022    [facility]/pharmacy #[id number] - [place], [address]    Sig: Take 2 tablets (40 mEq) by mouth 2 times daily    Class: E-Prescribe    Notes to Pharmacy: Dose increase    Route: Oral          ALLERGIES:   Patient has no known allergies.    ROS:      Physical Examination:   VITALS:   There were no vitals taken for this visit.  GENERAL: Healthy, alert and no distress  SKIN: Visible skin clear. No significant rash, abnormal pigmentation or lesions.  PSYCH: Mentation appears normal, affect normal/bright, judgement and insight intact, normal speech and appearance well-groomed.  EYES: Eyes grossly normal to inspection. No discharge or erythema, or obvious scleral/conjunctival abnormalities.  RESP: No audible wheeze, cough, or visible cyanosis. No visible retractions or increased work of breathing.   NEURO: Cranial nerves grossly intact. Mentation and speech appropriate for age.      Labs:    BMP RESULTS:  Lab Results   Component Value Date     06/28/2022    POTASSIUM 3.4 06/28/2022    CHLORIDE 107 06/28/2022    CO2 28 06/28/2022    ANIONGAP 4 06/28/2022    GLC 80 06/28/2022     BUN 8 06/28/2022    CR 0.67 06/28/2022    GFRESTIMATED >90 06/28/2022    DEACON 8.9 06/28/2022        CBC RESULTS:  Lab Results   Component Value Date    WBC 5.7 05/01/2022    RBC 4.36 05/01/2022    HGB 13.0 05/01/2022    HGB 13.3 05/01/2022    HCT 38.3 05/01/2022    HCT 39 05/01/2022    MCV 88 05/01/2022    MCH 29.8 05/01/2022    MCHC 33.9 05/01/2022    RDW 13.4 05/01/2022     05/01/2022       INR/PTT:  No results found for: INR, PTT    Diagnostic studies:   Imaging personally reviewed by me.  CT on 8/5/2022 demonstrates no discernible adrenal nodule.  The right adrenal vein was not identified.  The left adrenal vein was identified.    Radiologist interpretation:  IMPRESSION:   1. The left adrenal vein measures up to 5 mm in diameter and enters  the superior aspect of the left renal vein approximately 1.7 cm from  the IVC.  2. The right adrenal vein is not definitively identified.    Assessment: 26-year-old female with hypertension and primary aldosteronism.  She is appropriate for adrenal vein sampling. I explained that if a single adrenal gland is responsible for his hyperaldosteronism, a adrenalectomy may be considered in the future, directed by the referring clinician. I discussed the process of adrenal vein sampling, which is an outpatient procedure done under moderate sedation.  I discussed the technical details and challenges, and discussed the risks of access site bleeding, vascular injury, adrenal vein rupture, adrenal hematoma, technical failure, and despite catheterization of the adrenal veins there can be a challenge achieving adequate volume blood sampling with small veins, and repeat sampling may be needed.  However, this is a rarity in my practice.  She would like to proceed      Plan:   1. Plan for adrenal vein sampling.   2.  Prescription for rapidly absorbing liquid potassium was provided for him to take 1 hour prior to presenting to check in on the day of procedure. This will reduce the rneed  to replace any potassium while on 2A immediately prior to the procedure.    It was a pleasure to conduct this video visit with Ms. June today.  Thank you for involving the interventional radiology service in her care.    I spent a total of 17 minutes face-to-face time on today's video visit, over 50% time was for counseling and care coordination.  In addition I spent 10 minutes reviewing imaging and 10 minutes completing documentation.    Wen Maher MD  Interventional Radiology   Pager [phone]        Hyperaldosteronism (H)  - IR Adrenal Venogram Bilateral; Future     Review of the result(s) of each unique test - CT  Independent interpretation of a test performed by another physician/other qualified health care professional (not separately reported) - CT        CC  Patient Care Team:  [facility] as PCP - General (Family Practice)  Kenny Nowak PA-C as Assigned PCP  Jitendra Fuchs MD as MD (Cardiovascular Disease)  [facility] ([facility])  Jitendra Fuchs MD as Assigned Heart and Vascular Provider  Elvis Sanchez MD as MD (Endocrinology, Diabetes, and Metabolism)  Elvis Sanchez MD as Assigned Endocrinology Provider  Wen Maher MD as MD (Vascular and Interventional Radiology)  Marleny Zuniga, DYAN as Specialty Care Coordinator (Vascular and Interventional Radiology)  SELF, REFERRED

## 2022-09-09 ENCOUNTER — TELEPHONE (OUTPATIENT)
Dept: RADIOLOGY | Facility: CLINIC | Age: 27
End: 2022-09-09

## 2022-09-09 DIAGNOSIS — E26.9 HYPERALDOSTERONISM (H): Primary | ICD-10-CM

## 2022-09-09 DIAGNOSIS — E87.6 HYPOKALEMIA: ICD-10-CM

## 2022-09-09 RX ORDER — POTASSIUM CHLORIDE 3 G/15ML
40 SOLUTION ORAL
Qty: 15 ML | Refills: 0 | Status: SHIPPED | OUTPATIENT
Start: 2022-09-09 | End: 2022-12-12 | Stop reason: CLARIF

## 2022-09-09 NOTE — TELEPHONE ENCOUNTER
I called and spoke with Zain she is scheduled for her AVS procedure and a SpringCM message was sent with Appt details.  All questions answered.  ELENO Zuniga RN, BSN  Interventional Radiology Nurse Coordinator   Phone:  [phone]

## 2022-09-19 DIAGNOSIS — I10 HYPERTENSION GOAL BP (BLOOD PRESSURE) < 140/90: Primary | ICD-10-CM

## 2022-09-19 RX ORDER — AMLODIPINE BESYLATE 5 MG/1
5 TABLET ORAL DAILY
Qty: 60 TABLET | Refills: 3 | Status: SHIPPED | OUTPATIENT
Start: 2022-09-19 | End: 2023-02-02

## 2022-10-03 ENCOUNTER — HEALTH MAINTENANCE LETTER (OUTPATIENT)
Age: 27
End: 2022-10-03

## 2022-10-13 ENCOUNTER — TELEPHONE (OUTPATIENT)
Dept: INTERVENTIONAL RADIOLOGY/VASCULAR | Facility: CLINIC | Age: 27
End: 2022-10-13

## 2022-10-13 NOTE — TELEPHONE ENCOUNTER
Wolf Moore will be the Core Lab Contact for procedure in IR on 10/17/22. Merline or Phil as backups.    Kimberly CONTRERAS  Interventional Radiology RN   [phone]

## 2022-10-17 ENCOUNTER — APPOINTMENT (OUTPATIENT)
Dept: INTERVENTIONAL RADIOLOGY/VASCULAR | Facility: CLINIC | Age: 27
End: 2022-10-17
Attending: RADIOLOGY
Payer: COMMERCIAL

## 2022-10-17 ENCOUNTER — APPOINTMENT (OUTPATIENT)
Dept: MEDSURG UNIT | Facility: CLINIC | Age: 27
End: 2022-10-17
Attending: RADIOLOGY
Payer: COMMERCIAL

## 2022-10-17 ENCOUNTER — HOSPITAL ENCOUNTER (OUTPATIENT)
Facility: CLINIC | Age: 27
Discharge: HOME | End: 2022-10-17
Attending: RADIOLOGY | Admitting: RADIOLOGY
Payer: COMMERCIAL

## 2022-10-17 VITALS
BODY MASS INDEX: 29.2 KG/M2 | TEMPERATURE: 98.2 F | WEIGHT: 192.68 LBS | HEIGHT: 68 IN | SYSTOLIC BLOOD PRESSURE: 125 MMHG | OXYGEN SATURATION: 99 % | DIASTOLIC BLOOD PRESSURE: 103 MMHG | RESPIRATION RATE: 18 BRPM | HEART RATE: 87 BPM

## 2022-10-17 DIAGNOSIS — E26.9 HYPERALDOSTERONISM (H): ICD-10-CM

## 2022-10-17 LAB
ANION GAP SERPL CALCULATED.3IONS-SCNC: 10 MMOL/L (ref 7–15)
APTT PPP: 30 SECONDS (ref 22–38)
BUN SERPL-MCNC: 9.1 MG/DL (ref 6–20)
CALCIUM SERPL-MCNC: 8.9 MG/DL (ref 8.6–10)
CHLORIDE SERPL-SCNC: 102 MMOL/L (ref 98–107)
CORTIS SERPL-MCNC: 22.4 UG/DL
CORTIS SERPL-MCNC: 22.5 UG/DL
CORTIS SERPL-MCNC: 23 UG/DL
CORTIS SERPL-MCNC: 23.2 UG/DL
CORTIS SERPL-MCNC: 23.3 UG/DL
CORTIS SERPL-MCNC: 24 UG/DL
CORTIS SERPL-MCNC: 24.1 UG/DL
CORTIS SERPL-MCNC: 29.5 UG/DL
CORTIS SERPL-MCNC: 587 UG/DL
CORTIS SERPL-MCNC: 6.6 UG/DL
CORTIS SERPL-MCNC: 609 UG/DL
CORTIS SERPL-MCNC: 614 UG/DL
CORTIS SERPL-MCNC: 651 UG/DL
CORTIS SERPL-MCNC: 726 UG/DL
CORTIS SERPL-MCNC: 920 UG/DL
CREAT SERPL-MCNC: 0.56 MG/DL (ref 0.51–0.95)
DEPRECATED HCO3 PLAS-SCNC: 27 MMOL/L (ref 22–29)
ERYTHROCYTE [DISTWIDTH] IN BLOOD BY AUTOMATED COUNT: 13.5 % (ref 10–15)
GFR SERPL CREATININE-BSD FRML MDRD: >90 ML/MIN/1.73M2
GLUCOSE SERPL-MCNC: 96 MG/DL (ref 70–99)
HCG UR QL: NEGATIVE
HCT VFR BLD AUTO: 37.3 % (ref 35–47)
HGB BLD-MCNC: 12.6 G/DL (ref 11.7–15.7)
INR PPP: 1.01 (ref 0.85–1.15)
MCH RBC QN AUTO: 28.8 PG (ref 26.5–33)
MCHC RBC AUTO-ENTMCNC: 33.8 G/DL (ref 31.5–36.5)
MCV RBC AUTO: 85 FL (ref 78–100)
PLATELET # BLD AUTO: 277 10E3/UL (ref 150–450)
POTASSIUM BLD-SCNC: 3.4 MMOL/L (ref 3.4–5.3)
POTASSIUM BLD-SCNC: 3.8 MMOL/L (ref 3.4–5.3)
POTASSIUM SERPL-SCNC: 3.1 MMOL/L (ref 3.4–5.3)
RBC # BLD AUTO: 4.38 10E6/UL (ref 3.8–5.2)
SODIUM SERPL-SCNC: 139 MMOL/L (ref 136–145)
WBC # BLD AUTO: 4.7 10E3/UL (ref 4–11)

## 2022-10-17 PROCEDURE — 81025 URINE PREGNANCY TEST: CPT | Performed by: RADIOLOGY

## 2022-10-17 PROCEDURE — 96365 THER/PROPH/DIAG IV INF INIT: CPT

## 2022-10-17 PROCEDURE — 272N000143 HC KIT CR3

## 2022-10-17 PROCEDURE — 99152 MOD SED SAME PHYS/QHP 5/>YRS: CPT

## 2022-10-17 PROCEDURE — 85730 THROMBOPLASTIN TIME PARTIAL: CPT | Performed by: NURSE PRACTITIONER

## 2022-10-17 PROCEDURE — 272N000192 HC ACCESSORY CR2

## 2022-10-17 PROCEDURE — 82533 TOTAL CORTISOL: CPT | Performed by: NURSE PRACTITIONER

## 2022-10-17 PROCEDURE — 36415 COLL VENOUS BLD VENIPUNCTURE: CPT | Performed by: NURSE PRACTITIONER

## 2022-10-17 PROCEDURE — 75893 VENOUS SAMPLING BY CATHETER: CPT | Mod: XS

## 2022-10-17 PROCEDURE — 76937 US GUIDE VASCULAR ACCESS: CPT

## 2022-10-17 PROCEDURE — 250N000009 HC RX 250: Performed by: STUDENT IN AN ORGANIZED HEALTH CARE EDUCATION/TRAINING PROGRAM

## 2022-10-17 PROCEDURE — C1769 GUIDE WIRE: HCPCS

## 2022-10-17 PROCEDURE — 36500 INSERTION OF CATHETER VEIN: CPT | Mod: XS

## 2022-10-17 PROCEDURE — 99152 MOD SED SAME PHYS/QHP 5/>YRS: CPT | Mod: GC | Performed by: RADIOLOGY

## 2022-10-17 PROCEDURE — 36011 PLACE CATHETER IN VEIN: CPT | Mod: 50

## 2022-10-17 PROCEDURE — 85014 HEMATOCRIT: CPT | Performed by: NURSE PRACTITIONER

## 2022-10-17 PROCEDURE — C1887 CATHETER, GUIDING: HCPCS

## 2022-10-17 PROCEDURE — 80048 BASIC METABOLIC PNL TOTAL CA: CPT | Performed by: NURSE PRACTITIONER

## 2022-10-17 PROCEDURE — 250N000011 HC RX IP 250 OP 636: Performed by: NURSE PRACTITIONER

## 2022-10-17 PROCEDURE — 75893 VENOUS SAMPLING BY CATHETER: CPT | Mod: 26 | Performed by: RADIOLOGY

## 2022-10-17 PROCEDURE — 255N000002 HC RX 255 OP 636: Performed by: RADIOLOGY

## 2022-10-17 PROCEDURE — 82088 ASSAY OF ALDOSTERONE: CPT | Performed by: RADIOLOGY

## 2022-10-17 PROCEDURE — 272N000566 HC SHEATH CR3

## 2022-10-17 PROCEDURE — 82533 TOTAL CORTISOL: CPT | Performed by: RADIOLOGY

## 2022-10-17 PROCEDURE — 85610 PROTHROMBIN TIME: CPT | Performed by: NURSE PRACTITIONER

## 2022-10-17 PROCEDURE — 999N000133 HC STATISTIC PP CARE STAGE 2

## 2022-10-17 PROCEDURE — 84132 ASSAY OF SERUM POTASSIUM: CPT | Performed by: RADIOLOGY

## 2022-10-17 PROCEDURE — 250N000013 HC RX MED GY IP 250 OP 250 PS 637: Performed by: NURSE PRACTITIONER

## 2022-10-17 PROCEDURE — 36500 INSERTION OF CATHETER VEIN: CPT

## 2022-10-17 PROCEDURE — 258N000003 HC RX IP 258 OP 636: Performed by: NURSE PRACTITIONER

## 2022-10-17 PROCEDURE — 36592 COLLECT BLOOD FROM PICC: CPT

## 2022-10-17 PROCEDURE — 272N000504 HC NEEDLE CR4

## 2022-10-17 PROCEDURE — 36415 COLL VENOUS BLD VENIPUNCTURE: CPT | Performed by: RADIOLOGY

## 2022-10-17 PROCEDURE — 82088 ASSAY OF ALDOSTERONE: CPT | Performed by: NURSE PRACTITIONER

## 2022-10-17 PROCEDURE — 250N000011 HC RX IP 250 OP 636: Performed by: STUDENT IN AN ORGANIZED HEALTH CARE EDUCATION/TRAINING PROGRAM

## 2022-10-17 PROCEDURE — 36500 INSERTION OF CATHETER VEIN: CPT | Mod: XS | Performed by: RADIOLOGY

## 2022-10-17 PROCEDURE — 999N000142 HC STATISTIC PROCEDURE PREP ONLY

## 2022-10-17 RX ORDER — POTASSIUM CHLORIDE 1.5 G/1.58G
80 POWDER, FOR SOLUTION ORAL
Status: COMPLETED | OUTPATIENT
Start: 2022-10-17 | End: 2022-10-17

## 2022-10-17 RX ORDER — POTASSIUM CHLORIDE 1.5 G/1.58G
40 POWDER, FOR SOLUTION ORAL
Status: DISCONTINUED | OUTPATIENT
Start: 2022-10-17 | End: 2022-10-17

## 2022-10-17 RX ORDER — POTASSIUM CHLORIDE 7.45 MG/ML
10 INJECTION INTRAVENOUS ONCE
Status: COMPLETED | OUTPATIENT
Start: 2022-10-17 | End: 2022-10-17

## 2022-10-17 RX ORDER — FENTANYL CITRATE 50 UG/ML
25-50 INJECTION, SOLUTION INTRAMUSCULAR; INTRAVENOUS EVERY 5 MIN PRN
Status: DISCONTINUED | OUTPATIENT
Start: 2022-10-17 | End: 2022-10-17 | Stop reason: HOSPADM

## 2022-10-17 RX ORDER — LIDOCAINE 40 MG/G
CREAM TOPICAL
Status: DISCONTINUED | OUTPATIENT
Start: 2022-10-17 | End: 2022-10-17 | Stop reason: HOSPADM

## 2022-10-17 RX ORDER — NALOXONE HYDROCHLORIDE 0.4 MG/ML
0.4 INJECTION, SOLUTION INTRAMUSCULAR; INTRAVENOUS; SUBCUTANEOUS
Status: DISCONTINUED | OUTPATIENT
Start: 2022-10-17 | End: 2022-10-17 | Stop reason: HOSPADM

## 2022-10-17 RX ORDER — SODIUM CHLORIDE 9 MG/ML
INJECTION, SOLUTION INTRAVENOUS CONTINUOUS
Status: DISCONTINUED | OUTPATIENT
Start: 2022-10-17 | End: 2022-10-17 | Stop reason: HOSPADM

## 2022-10-17 RX ORDER — FLUMAZENIL 0.1 MG/ML
0.2 INJECTION, SOLUTION INTRAVENOUS
Status: DISCONTINUED | OUTPATIENT
Start: 2022-10-17 | End: 2022-10-17 | Stop reason: HOSPADM

## 2022-10-17 RX ORDER — NALOXONE HYDROCHLORIDE 0.4 MG/ML
0.2 INJECTION, SOLUTION INTRAMUSCULAR; INTRAVENOUS; SUBCUTANEOUS
Status: DISCONTINUED | OUTPATIENT
Start: 2022-10-17 | End: 2022-10-17 | Stop reason: HOSPADM

## 2022-10-17 RX ORDER — HEPARIN SODIUM 200 [USP'U]/100ML
1 INJECTION, SOLUTION INTRAVENOUS CONTINUOUS PRN
Status: DISCONTINUED | OUTPATIENT
Start: 2022-10-17 | End: 2022-10-17 | Stop reason: HOSPADM

## 2022-10-17 RX ORDER — IODIXANOL 320 MG/ML
100 INJECTION, SOLUTION INTRAVASCULAR ONCE
Status: COMPLETED | OUTPATIENT
Start: 2022-10-17 | End: 2022-10-17

## 2022-10-17 RX ADMIN — POTASSIUM CHLORIDE 10 MEQ: 7.46 INJECTION, SOLUTION INTRAVENOUS at 09:38

## 2022-10-17 RX ADMIN — FENTANYL CITRATE 50 MCG: 0.05 INJECTION, SOLUTION INTRAMUSCULAR; INTRAVENOUS at 12:17

## 2022-10-17 RX ADMIN — HEPARIN SODIUM 2 BAG: 200 INJECTION, SOLUTION INTRAVENOUS at 11:17

## 2022-10-17 RX ADMIN — MIDAZOLAM HYDROCHLORIDE 0.5 MG: 1 INJECTION, SOLUTION INTRAMUSCULAR; INTRAVENOUS at 11:22

## 2022-10-17 RX ADMIN — POTASSIUM CHLORIDE 80 MEQ: 1.5 POWDER, FOR SOLUTION ORAL at 09:13

## 2022-10-17 RX ADMIN — IODIXANOL 100 ML: 320 INJECTION, SOLUTION INTRAVASCULAR at 12:33

## 2022-10-17 RX ADMIN — MIDAZOLAM HYDROCHLORIDE 0.5 MG: 1 INJECTION, SOLUTION INTRAMUSCULAR; INTRAVENOUS at 11:07

## 2022-10-17 RX ADMIN — LIDOCAINE HYDROCHLORIDE 10 ML: 10 INJECTION, SOLUTION EPIDURAL; INFILTRATION; INTRACAUDAL; PERINEURAL at 12:19

## 2022-10-17 RX ADMIN — SODIUM CHLORIDE: 9 INJECTION, SOLUTION INTRAVENOUS at 07:32

## 2022-10-17 RX ADMIN — FENTANYL CITRATE 25 MCG: 0.05 INJECTION, SOLUTION INTRAMUSCULAR; INTRAVENOUS at 11:07

## 2022-10-17 RX ADMIN — COSYNTROPIN 50 MCG/HR: 0.25 INJECTION, POWDER, LYOPHILIZED, FOR SOLUTION INTRAVENOUS at 10:11

## 2022-10-17 RX ADMIN — MIDAZOLAM HYDROCHLORIDE 0.5 MG: 1 INJECTION, SOLUTION INTRAMUSCULAR; INTRAVENOUS at 11:15

## 2022-10-17 RX ADMIN — POTASSIUM CHLORIDE 80 MEQ: 1.5 POWDER, FOR SOLUTION ORAL at 08:04

## 2022-10-17 RX ADMIN — FENTANYL CITRATE 25 MCG: 0.05 INJECTION, SOLUTION INTRAMUSCULAR; INTRAVENOUS at 11:15

## 2022-10-17 RX ADMIN — FENTANYL CITRATE 25 MCG: 0.05 INJECTION, SOLUTION INTRAMUSCULAR; INTRAVENOUS at 11:22

## 2022-10-17 ASSESSMENT — ACTIVITIES OF DAILY LIVING (ADL)
ADLS_ACUITY_SCORE: 35

## 2022-10-17 NOTE — DISCHARGE INSTRUCTIONS
[facility]   Interventional Radiology  Discharge Instructions Post Adrenal Vein Sampling Procedure      AFTER YOU GO HOME          Relax and take it easy for 24 hours.       Drink plenty of fluids.       Resume your regular diet, unless otherwise instructed by your Primary Physician.       DO NOT smoke for at least 24 hours, if you were given any sedation.       DO NOT drink alcoholic beverages the day of your procedure.       DO NOT drive or operate machinery at home or at work for 24 hours.          DO NOT make any important or legal decisions for 24 hours following your procedure.       DO NOT take a shower for at least 12 hours following your procedure. No tub bath, hot tubs, or swimming for 5 days        Care of groin site  It is normal to have a small bruise or lump at the site.  For the first 2 days, when you cough, sneeze or move your bowels, hold your hand over the puncture site and press gently.  Do NOT lift more than 10 pounds or do any strenuous exercise for at least 3 to 5 days.  Do not use lotion or powder near the puncture site for 3 days.  If you start bleeding from the site in your groin: lie down flat and press firmly on the site. Call your doctor as soon as you can.   Call 911 right away if you have: Heavy bleeding, bleeding that does not stop.            CALL THE PHYSICIAN IF:       - You start bleeding from the procedure site. A small lump or bruise is common at the puncture site. Your physician will tell you if you need to return to the hospital.      - You develop numbness, coolness or a change in color of the leg that was punctured.      - You experience increased pain or redness at the puncture site.      - You develop hives or a rash or unexplained itching.      - You develop a temperature of 101 degrees F or greater.                   [facility] INTERVENTIONAL RADIOLOGY DEPARTMENT         Procedure Physician:Dr. Wen Maher                            Date of  Procedure:October 17, 2022       Telephone Numbers:   [phone]      Monday-Friday 7:30 am to 4:00 pm                                        [phone]     After 4:00 pm Monday-Friday, Weekend and Holidays. Ask for the Interventional Radiologist on call. Someone is on call 24 hrs/day.

## 2022-10-17 NOTE — PROGRESS NOTES
PIV's discontinued. Discharge paperwork reviewed with patient and her mother, pt will transport home at 1430 per MD's orders.

## 2022-10-17 NOTE — PROGRESS NOTES
Patient arrived to floor with RN from IR s/p Adrenal Vein sampling. VSS. Bilateral venous groin sites CDI, no hematoma. Patient denies pain, tolerating regular diet. Mother at bedside.

## 2022-10-17 NOTE — PRE-PROCEDURE
GENERAL PRE-PROCEDURE:   Procedure:  IR adrenal venous sampling  Date/Time:  10/17/2022 7:12 AM    Verbal consent obtained?: Yes    Written consent obtained?: Yes    Risks and benefits: Risks, benefits and alternatives were discussed    Consent given by:  Patient  Patient states understanding of procedure being performed: Yes    Patient's understanding of procedure matches consent: Yes    Procedure consent matches procedure scheduled: Yes    Expected level of sedation:  Moderate  Appropriately NPO:  Yes  ASA Class:  2  Mallampati  :  Grade 3- soft palate visible, posterior pharyngeal wall not visible  Lungs:  Lungs clear with good breath sounds bilaterally  Heart:  Normal heart sounds and rate  History & Physical reviewed:  History and physical reviewed and no updates needed  Statement of review:  I have reviewed the lab findings, diagnostic data, medications, and the plan for sedation

## 2022-10-17 NOTE — PROGRESS NOTES
Prep complete for Adrenal Vein Sampling. Awaiting lab results. Mother Brenda at bedside cell# [phone].

## 2022-10-17 NOTE — PROCEDURES
[facility]    Procedure: IR Adrenal venous sampling    Date/Time: 10/17/2022 12:30 PM  Performed by: Tevin Hastings MD  Authorized by: Wen Maher MD       UNIVERSAL PROTOCOL   Site Marked: NA  Prior Images Obtained and Reviewed:  Yes  Required items: Required blood products, implants, devices and special equipment available    Patient identity confirmed:  Verbally with patient, arm band, provided demographic data and hospital-assigned identification number  Patient was reevaluated immediately before administering moderate or deep sedation or anesthesia  Confirmation Checklist:  Patient's identity using two indicators, relevant allergies, procedure was appropriate and matched the consent or emergent situation and correct equipment/implants were available  Time out: Immediately prior to the procedure a time out was called    Universal Protocol: the Joint Commission Universal Protocol was followed    Preparation: Patient was prepped and draped in usual sterile fashion    ESBL (mL):  20     ANESTHESIA    Anesthesia: Local infiltration  Local Anesthetic:  Lidocaine 1% without epinephrine  Anesthetic Total (mL):  10      SEDATION  Patient Sedated: Yes    Sedation Type:  Moderate (conscious) sedation  Sedation:  Fentanyl and midazolam  Vital signs: Vital signs monitored during sedation    See dictated procedure note for full details.  Findings: Bilateral adrenal venous sampling performed.    Specimens: none    Complications: None    Condition: Stable      PROCEDURE    Patient Tolerance:  Patient tolerated the procedure well with no immediate complications  Length of time physician/provider present for 1:1 monitoring during sedation: 75

## 2022-10-17 NOTE — PROGRESS NOTES
Patient Name: Philipp June  Medical Record Number: [id number]  Today's Date: 10/17/2022    Procedure: Adrenal venogram   Proceduralist: Dr. Maher, Dr. Hastings    Pathology present: N/A    Procedure Start: 11:14  Procedure end: 12:30  Sedation Start: 11:07  Sedation End:  12:25  Total Sedation Time: 78  Sedation medications administered: Midazolam 1.5 mg, Fentanyl 1255 mcg    Report given to: Suri HUNT RNInterpreter: N/A    Other Notes: Pt arrived to IR room 4 from . Consent reviewed. Pt denies any questions or concerns regarding procedure. Pt positioned supine and monitored per protocol. Bilateral groin access.    Right Sheath removed at 1218 Manual pressure held for 7 minutes;    Left Sheath removed at 1217 Manual pressure held for 7 minutes;       Groin site appearance: D/I       Pt tolerated procedure without any noted complications. Pt transferred back to .

## 2022-10-18 LAB
ALDOST SERPL-MCNC: 1650 NG/DL (ref 0–31)
ALDOST SERPL-MCNC: 55 NG/DL (ref 0–31)
ALDOST SERPL-MCNC: 57.8 NG/DL (ref 0–31)
ALDOST SERPL-MCNC: 709 NG/DL (ref 0–31)
ALDOST SERPL-MCNC: 76 NG/DL (ref 0–31)
CORTIS SERPL-MCNC: 577 UG/DL
CORTIS SERPL-MCNC: 763 UG/DL

## 2022-11-11 DIAGNOSIS — I10 HYPERTENSION GOAL BP (BLOOD PRESSURE) < 140/90: ICD-10-CM

## 2022-11-11 RX ORDER — AMLODIPINE BESYLATE 5 MG/1
TABLET ORAL
Qty: 60 TABLET | Refills: 3 | OUTPATIENT
Start: 2022-11-11

## 2022-11-18 ENCOUNTER — OFFICE VISIT (OUTPATIENT)
Dept: CARDIOLOGY | Facility: CLINIC | Age: 27
End: 2022-11-18
Payer: COMMERCIAL

## 2022-11-18 VITALS
WEIGHT: 197 LBS | SYSTOLIC BLOOD PRESSURE: 137 MMHG | DIASTOLIC BLOOD PRESSURE: 89 MMHG | BODY MASS INDEX: 29.95 KG/M2 | HEART RATE: 93 BPM | OXYGEN SATURATION: 100 %

## 2022-11-18 DIAGNOSIS — I10 HYPERTENSION GOAL BP (BLOOD PRESSURE) < 140/90: Primary | ICD-10-CM

## 2022-11-18 PROCEDURE — 99214 OFFICE O/P EST MOD 30 MIN: CPT | Performed by: INTERNAL MEDICINE

## 2022-11-18 NOTE — PROGRESS NOTES
[facility] Cardiology Consultation:    Assessment and Plan:     1.  Hypertension, controlled  Continue amlodipine 5 mg, losartan 100 mg along with potassium supplements  2.  Primary hyperaldosteronism: She is being evaluated for treatment options by endocrinology.     Jitendra Fuchs MD    Cardiac Imaging and Prevention  [facility]  [email or web address] Pager: [phone]    HPI: Hypertension follow-up.  Since I last saw her, she has been diagnosed with primary hyperaldosteronism.  At that point I had referred her to endocrinology who has been guiding further evaluation.  She had adrenal vein sampling performed last month and is waiting to hear back regarding further management.  She is currently on losartan 100 mg and amlodipine 5 mg.  Since adding the amlodipine her blood pressure is well controlled.  Overall she feels improved since her blood pressure has been better.  Thankfully her potassium is finally normal with potassium supplements.    EXAM:  /89 (BP Location: Right arm, Patient Position: Sitting, Cuff Size: Adult Regular)   Pulse 93   Wt 89.4 kg (197 lb)   SpO2 100%   BMI 29.95 kg/m    GEN/CONSTITUIONAL: Appears comfortable, in no apparent distress   EYES: No icterus  ENT/MOUTH: Normal  JVP:  Not visible  RESPIRATORY: Clear to auscultation bilaterally   CARDIOVASCULAR: Regular S1 and S2, no murmurs, rubs, or gallops.   ABDOMEN: Soft, non-tender, positive bowel sounds   NEUROLOGIC: Grossly non-focal   PSYCHIATRIC: Normal affect  EXT: No cyanosis, clubbing, edema. Normal pedal pulses.  Skin: No petechiae, purpura or rash    PAST MEDICAL HISTORY:  Past Medical History:   Diagnosis Date     Benign essential hypertension        CURRENT MEDICATIONS:  Current Outpatient Medications   Medication     amLODIPine (NORVASC) 5 MG tablet     biotin 1000 MCG TABS tablet     cholecalciferol (VITAMIN D3) 125 mcg (5000 units) capsule     losartan (COZAAR) 100 MG tablet      Potassium Chloride 40 MEQ/15ML (20%) SOLN     potassium chloride ER (KLOR-CON) 20 MEQ CR tablet     No current facility-administered medications for this visit.       PAST SURGICAL HISTORY:  No past surgical history on file.    ALLERGIES   No Known Allergies    FAMILY HISTORY:  No family history on file.    SOCIAL HISTORY:  Social History     Socioeconomic History     Marital status: Single     Spouse name: Not on file     Number of children: Not on file     Years of education: Not on file     Highest education level: Not on file   Occupational History     Not on file   Tobacco Use     Smoking status: Never     Smokeless tobacco: Never   Substance and Sexual Activity     Alcohol use: Never     Drug use: Never     Sexual activity: Not on file   Other Topics Concern     Not on file   Social History Narrative     Not on file     Social Determinants of Health     Financial Resource Strain: Not on file   Food Insecurity: Not on file   Transportation Needs: Not on file   Physical Activity: Not on file   Stress: Not on file   Social Connections: Not on file   Intimate Partner Violence: Not on file   Housing Stability: Not on file       ROS:   Constitutional: No fever, chills, or sweats. No weight gain/loss   ENT: No visual disturbance, ear ache, epistaxis, sore throat  Allergies/Immunologic: Negative.   Respiratory: No cough, hemoptysia  Cardiovascular: As per HPI  GI: No nausea, vomiting, hematemesis, melena, or hematochezia  : No urinary frequency, dysuria, or hematuria  Integument: Negative  Psychiatric: Negative  Neuro: Negative  Endocrinology: Negative   Musculoskeletal: Negative    ADDITIONAL COMMENTS:     I reviewed the patient's medications:     I reviewed the patient's pertinent clinical laboratory studies:     I reviewed the patient's pertinent imaging studies:   I reviewed the patient's ECG:

## 2022-11-18 NOTE — Clinical Note
I saw her in clinic today.  BP is better now.  Looks to me that adrenal vein sampling has been reported.  She is eager to hear the results, and hoping surgery is an option.   Renee Fuchs

## 2022-11-18 NOTE — PROGRESS NOTES
Philipp June's goals for this visit include: Has noticed some gastrointestinal disturbances.     She requests these members of her care team be copied on today's visit information: PCP    [facility]    Referring Provider:  No referring provider defined for this encounter.      Do you need any medication refills at today's visit? No.    Kishor Crespo, EMT  Clinic Support  [facility]    [phone]    Employed by [facility] [employer]

## 2022-12-12 ENCOUNTER — MEDICAL CORRESPONDENCE (OUTPATIENT)
Dept: HEALTH INFORMATION MANAGEMENT | Facility: CLINIC | Age: 27
End: 2022-12-12

## 2022-12-12 ENCOUNTER — VIRTUAL VISIT (OUTPATIENT)
Dept: ENDOCRINOLOGY | Facility: CLINIC | Age: 27
End: 2022-12-12
Payer: COMMERCIAL

## 2022-12-12 DIAGNOSIS — E26.9 HYPERALDOSTERONISM (H): Primary | ICD-10-CM

## 2022-12-12 DIAGNOSIS — I10 HYPERTENSION GOAL BP (BLOOD PRESSURE) < 140/90: ICD-10-CM

## 2022-12-12 DIAGNOSIS — E55.9 VITAMIN D DEFICIENCY: ICD-10-CM

## 2022-12-12 DIAGNOSIS — E63.8 INADEQUATE INTAKE OF CALCIUM: ICD-10-CM

## 2022-12-12 DIAGNOSIS — E21.3 HYPERPARATHYROIDISM (H): ICD-10-CM

## 2022-12-12 PROCEDURE — 99215 OFFICE O/P EST HI 40 MIN: CPT | Mod: 95 | Performed by: INTERNAL MEDICINE

## 2022-12-12 NOTE — PROGRESS NOTES
Video visit    Start time 1:20, stop time 1:41; additional 20 minutes spent on the date of the encounter doing chart review, documentation and further activities as noted.     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 The following is a comprehensive summary of her Endocrine care to date.      # Primary aldosteronism    She was first told she had hypertension at a routine visit sometime in 2021 (systolic 140 - 150) and she doesn't recall exactly when this was, she had routine blood pressure screening prior to that but was never told she had hypertension.      6/28/2022: serum aldosterone 58.5 ng/dL with paired PRA <0.1 ng/mL/hr       She has had intermittent hypokalemia. First ever labs done 2/2021 showed hypokalemia. GFR normal.     Because of spontaneous hypokalemia, and because her aldosterone is >= 20 ng/dL with suppressed PRA, this confirms the diagnosis of primary aldosteronism and confirmatory testing is not needed.    CT abdomen 6/28/2022 and again 8/5/2022: I reviewed the images and agree with radiology that the adrenal glands appear normal. There was an incidental finding of a small fat-containing umbilical hernia.     TTE 3/2022: normal LVEF, there is LVH - she has seen a cardiologist for this     BP medications as of 6/23/2022:  -Losartan 100 mg daily monotherapy   -Potassium chloride 40 mEq BID    10/17/2022 she underwent AVS at [facility] with Dr. Wen Maher    Results of AVS below - protocol used at [facility] was with Cosyntropin infusion. During AVS she was on amlodipine 5 mg and losartan 100 mg as well as potassium supplementation.          From the 10/17/2022 AVS:  -The adrenal : IVC cortisol ratios are >5:1   -ALR of 2.94 left : right and a contralateral  suppression index of 0.39    Current BP medications as of 12/12/2022:  -Amlodipine 5 mg daily; well tolerated - although she has had dyspepsia in the past few months after starting this  -Losartan 100 mg daily monotherapy   -Potassium chloride 40 mEq BID  -BP on the above regimen have been around 135/90s      No FH of hypertension. No CVD in the family.      # Secondary hyperparathyroidism due to vitamin D deficiency and inadequate calcium intake  -No prior hypercalcemia, serum phosphorus previously normal  -10/5/2021: serum calcium mildly low, vitamin D low, PTH mildly elevated   -2/2022: vitamin D 49 (ref range 20 - 75 mcg/L)     6/2022: she takes vitamin D 5000 international unit(s) daily (stable dose for about 6 months). She has <1 serving of dairy daily. She does eat green leafy vegetables.      No prior nephrolithiasis. No prior fracture.    6/28/2022: GFR >90, minerals normal, 25-OH vitamin D 61 (ref range 20 - 75 mcg/L), PTH 83 (ULN 65 pg/mL)      12/12/2022: she continues on vitamin D 5000 international unit(s) daily, she take a calcium supplement daily      # Thyroid function     First time TFT was checked 8/2021 (she was asymptomatic, this was a routine screening test): TSH 0.345 (ref range 0.358 - 4.74), T4/T3 both normal     10/2021: Tg Ab, TSI, TPO all undetectable      Subsequently TSH always normal, most recently checked 6/2022     No prior neck surgery. No prior H/N radiation. No FH of thyroid disease. No known thyroid nodule. No prior thyroid ultrasound.     No tobacco use.     No prior pregnancies. She is planning pregnancy in the near future, but will hold off until we have adequately addressed her PA. She has normal menses. She is not using hormonal contraception at this time.      No endocrinopathies in the family.      She takes biotin.     Objective:    Virtual visit today.     6/2022: /122, BMI 27.69 kg/m2. No Cushingoid features. Sclera white. No thyroid eye disease. Thyroid examined  and normal. No cervical LAD. Radial pulse with a regular rate and rhythm. Breathing comfortably on room air.       Assessment/Plan:    # Primary aldosteronism     I reviewed her AVS results with several colleagues including Dr. Ken Carpio. If we take her IVC PAC (55) x 100, this should approximate the sum of the right and left adrenal vein PACs. In this case 55 x 100 = 5500 from the IVC. However right + left AV PAC is only = to 2359 ( 709 + 1650). This suggests that the source of excess aldosterone was not identified on the AVS done 10/17/2022.  Furthermore, the gradient of A/C from the left AV to the IVC is low. This suggests that she does not have primary left adrenal disease. She could have either IHA or a right aldosterone producing adenoma per my review with Dr. Carpio.     I reviewed the above in detail with Zain this afternoon.  We are both in agreement that if surgical cure is possible that is our chosen path.  I have referred her to Dr. Ken Carpio at [facility] for repeat AVS.    We reviewed that if her aldosterone excess is bilateral that we will start her on spironolactone or eplerenone and increase the dose as needed to achieve a high normal serum potassium without the need for potassium supplementation.  I have not started either of these medications yet to simplify the process so that we do not have to hold these prior to AVS.  I will touch base with her in a few weeks to make sure that the referral was successful.    In the interim she will increase amlodipine to 10 mg daily.  She knows what side effects to monitor for and will let me know if this occurs.     # Secondary hyperparathyroidism due to vitamin D deficiency      She will continue calcium and vitamin D. Will repeat PTH with minerals down the road.

## 2022-12-12 NOTE — LETTER
12/12/2022         RE: Philipp June  [address] [address]        Dear Colleague,    Thank you for referring your patient, Philipp June, to the [facility]. Please see a copy of my visit note below.    Video visit    Start time 1:20, stop time 1:41; additional 20 minutes spent on the date of the encounter doing chart review, documentation and further activities as noted.     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 The following is a comprehensive summary of her Endocrine care to date.      # Primary aldosteronism    She was first told she had hypertension at a routine visit sometime in 2021 (systolic 140 - 150) and she doesn't recall exactly when this was, she had routine blood pressure screening prior to that but was never told she had hypertension.      6/28/2022: serum aldosterone 58.5 ng/dL with paired PRA <0.1 ng/mL/hr       She has had intermittent hypokalemia. First ever labs done 2/2021 showed hypokalemia. GFR normal.     Because of spontaneous hypokalemia, and because her aldosterone is >= 20 ng/dL with suppressed PRA, this confirms the diagnosis of primary aldosteronism and confirmatory testing is not needed.    CT abdomen 6/28/2022 and again 8/5/2022: I reviewed the images and agree with radiology that the adrenal glands appear normal. There was an incidental finding of a small fat-containing umbilical hernia.     TTE 3/2022: normal LVEF, there is LVH - she has seen a cardiologist for this     BP medications as of 6/23/2022:  -Losartan 100 mg daily monotherapy   -Potassium chloride 40 mEq BID    10/17/2022 she underwent AVS at [facility] with Dr. Wen Maher    Results of AVS below - protocol used at [facility] was  with Cosyntropin infusion. During AVS she was on amlodipine 5 mg and losartan 100 mg as well as potassium supplementation.          From the 10/17/2022 AVS:  -The adrenal : IVC cortisol ratios are >5:1   -ALR of 2.94 left : right and a contralateral suppression index of 0.39    Current BP medications as of 12/12/2022:  -Amlodipine 5 mg daily; well tolerated - although she has had dyspepsia in the past few months after starting this  -Losartan 100 mg daily monotherapy   -Potassium chloride 40 mEq BID  -BP on the above regimen have been around 135/90s      No FH of hypertension. No CVD in the family.      # Secondary hyperparathyroidism due to vitamin D deficiency and inadequate calcium intake  -No prior hypercalcemia, serum phosphorus previously normal  -10/5/2021: serum calcium mildly low, vitamin D low, PTH mildly elevated   -2/2022: vitamin D 49 (ref range 20 - 75 mcg/L)     6/2022: she takes vitamin D 5000 international unit(s) daily (stable dose for about 6 months). She has <1 serving of dairy daily. She does eat green leafy vegetables.      No prior nephrolithiasis. No prior fracture.    6/28/2022: GFR >90, minerals normal, 25-OH vitamin D 61 (ref range 20 - 75 mcg/L), PTH 83 (ULN 65 pg/mL)      12/12/2022: she continues on vitamin D 5000 international unit(s) daily, she take a calcium supplement daily      # Thyroid function     First time TFT was checked 8/2021 (she was asymptomatic, this was a routine screening test): TSH 0.345 (ref range 0.358 - 4.74), T4/T3 both normal     10/2021: Tg Ab, TSI, TPO all undetectable      Subsequently TSH always normal, most recently checked 6/2022     No prior neck surgery. No prior H/N radiation. No FH of thyroid disease. No known thyroid nodule. No prior thyroid ultrasound.     No tobacco use.     No prior pregnancies. She is planning pregnancy in the near future, but will hold off until we have adequately addressed her PA. She has normal menses. She is not using hormonal  contraception at this time.      No endocrinopathies in the family.      She takes biotin.     Objective:    Virtual visit today.     6/2022: /122, BMI 27.69 kg/m2. No Cushingoid features. Sclera white. No thyroid eye disease. Thyroid examined and normal. No cervical LAD. Radial pulse with a regular rate and rhythm. Breathing comfortably on room air.       Assessment/Plan:    # Primary aldosteronism     I reviewed her AVS results with several colleagues including Dr. Ken Carpio. If we take her IVC PAC (55) x 100, this should approximate the sum of the right and left adrenal vein PACs. In this case 55 x 100 = 5500 from the IVC. However right + left AV PAC is only = to 2359 ( 709 + 1650). This suggests that the source of excess aldosterone was not identified on the AVS done 10/17/2022.  Furthermore, the gradient of A/C from the left AV to the IVC is low. This suggests that she does not have primary left adrenal disease. She could have either IHA or a right aldosterone producing adenoma per my review with Dr. Carpio.     I reviewed the above in detail with Zain this afternoon.  We are both in agreement that if surgical cure is possible that is our chosen path.  I have referred her to Dr. Ken Carpio at [facility] for repeat AVS.    We reviewed that if her aldosterone excess is bilateral that we will start her on spironolactone or eplerenone and increase the dose as needed to achieve a high normal serum potassium without the need for potassium supplementation.  I have not started either of these medications yet to simplify the process so that we do not have to hold these prior to AVS.  I will touch base with her in a few weeks to make sure that the referral was successful.    In the interim she will increase amlodipine to 10 mg daily.  She knows what side effects to monitor for and will let me know if this occurs.     # Secondary hyperparathyroidism due to vitamin D deficiency      She will continue  calcium and vitamin D. Will repeat PTH with minerals down the road.            Again, thank you for allowing me to participate in the care of your patient.        Sincerely,        Elvis Sanchez MD

## 2023-01-20 ENCOUNTER — OFFICE VISIT (OUTPATIENT)
Dept: OBGYN | Facility: CLINIC | Age: 28
End: 2023-01-20
Payer: COMMERCIAL

## 2023-01-20 VITALS
WEIGHT: 192.4 LBS | BODY MASS INDEX: 29.25 KG/M2 | SYSTOLIC BLOOD PRESSURE: 138 MMHG | HEART RATE: 85 BPM | DIASTOLIC BLOOD PRESSURE: 86 MMHG

## 2023-01-20 DIAGNOSIS — N92.6 IRREGULAR MENSES: ICD-10-CM

## 2023-01-20 DIAGNOSIS — I10 PRIMARY HYPERTENSION: ICD-10-CM

## 2023-01-20 DIAGNOSIS — E21.1 HYPERPARATHYROIDISM DUE TO VITAMIN D DEFICIENCY (H): Primary | ICD-10-CM

## 2023-01-20 DIAGNOSIS — E26.9 HYPERALDOSTERONISM (H): ICD-10-CM

## 2023-01-20 PROCEDURE — 99204 OFFICE O/P NEW MOD 45 MIN: CPT | Performed by: OBSTETRICS & GYNECOLOGY

## 2023-01-20 NOTE — PATIENT INSTRUCTIONS
To Schedule an Appointment 24/7  Call: [phone]    If you have any questions regarding your visit, Please contact your care team.  [facility]: [phone]  [facility] HOURS TELEPHONE NUMBER   Cephas Agbeh, M.D.      Sujit Jamil-Surgery Scheduler  Olga-Surgery Scheduler         Monday - [place]:    8:00 am-4:45 pm  Tuesday - [place]:   8:00 am-4:45 pm  Friday-[place]:       8:00 am-4:45 pm  Typical Surgery Day:  Wednesday [facility]   [address]   [place]AN[place] Paredes [id number] [phone]   Fax [phone]    Imaging Scheduling all locations  [phone]      [facility]   [address] [place] [address] Dr.   [place], [place] [address] [phone]    [facility]  [address] [facility] [address]  [phone]  Fax [phone]     Urgent Care locations:  [facility] Monday-Friday                               10 am - 8 pm  Saturday and Sunday                      9 am - 5 pm  Monday-Friday                              10 am- 8 pm  Saturday and Sunday                      9 am - 5 pm    [phone]   **Surgeries** Our Surgery Schedulers will contact you to schedule. If you do not receive a call within 3 business days, please call [phone].  If you need a medication refill, please contact your pharmacy. Please allow 3 business days for your refill to be completed.  As always, Thank you for trusting us with your healthcare needs!  see additional instructions from your care team below

## 2023-01-20 NOTE — PROGRESS NOTES
"Philipp is a 27 year old  referred here by self for consultation regarding   1.  Irregular menstrual cycles.  2.  Birth control options.  3.  Pregnancy discussion.    Patient says they tend to be heavy whenever it comes.   Her periods have always been irregular as far as she can remember.  Now her cycles about every 40 to 60 days apart.  Her last menstrual period was 2022.  Sh she has never been sexually active.  Sh has never been on any form of contraception.  She and her boyfriend who she has been dating for the last couple of years are planning on getting  and starting a family.    She denies any pelvic pain vaginal discharge itching burning.  She denies any family history of her endocrinology problems as listed below.           \"\"Bellow is recent visit with her endocrinologist.Elvis Sanchez MD  Endocrinology, Diabetes, and Metabolism  Assessment/Plan:     # Primary aldosteronism     I reviewed her AVS results with several colleagues including Dr. Ken Carpio. If we take her IVC PAC (55) x 100, this should approximate the sum of the right and left adrenal vein PACs. In this case 55 x 100 = 5500 from the IVC. However right + left AV PAC is only = to 2359 ( 709 + 1650). This suggests that the source of excess aldosterone was not identified on the AVS done 10/17/2022.  Furthermore, the gradient of A/C from the left AV to the IVC is low. This suggests that she does not have primary left adrenal disease. She could have either IHA or a right aldosterone producing adenoma per my review with Dr. Carpio.      I reviewed the above in detail with Zain this afternoon.  We are both in agreement that if surgical cure is possible that is our chosen path.  I have referred her to Dr. Ken Carpio at [facility] for repeat AVS.     We reviewed that if her aldosterone excess is bilateral that we will start her on spironolactone or eplerenone and increase the dose as needed to achieve a high " "normal serum potassium without the need for potassium supplementation.  I have not started either of these medications yet to simplify the process so that we do not have to hold these prior to AVS.  I will touch base with her in a few weeks to make sure that the referral was successful.     In the interim she will increase amlodipine to 10 mg daily.  She knows what side effects to monitor for and will let me know if this occurs.     # Secondary hyperparathyroidism due to vitamin D deficiency \"\"     She will continue calcium and vitamin D. Will repeat PTH with minerals down the road.   ROS: Ten point review of systems was reviewed and negative except the above.        Past Medical History:   Diagnosis Date     Benign essential hypertension      Past Surgical History:   Procedure Laterality Date     IR ADRENAL VENOGRAM BILATERAL  10/17/2022     Patient Active Problem List   Diagnosis     Vitamin D deficiency     Tuberculosis of peripheral lymph nodes     Redness of both eyes     Palpitation     Myopia of both eyes with astigmatism     Hyperparathyroidism due to vitamin D deficiency (H)     Dry eyes     Abnormal results of thyroid function studies       ALL/Meds: Her medication and allergy histories were reviewed and are documented in their appropriate chart areas.  Current Outpatient Medications   Medication     amLODIPine (NORVASC) 5 MG tablet     cholecalciferol (VITAMIN D3) 125 mcg (5000 units) capsule     losartan (COZAAR) 100 MG tablet     potassium chloride ER (KLOR-CON) 20 MEQ CR tablet     No current facility-administered medications for this visit.     SH: - tob, - EtOH, single    FH: Her family history was reviewed and documented in its appropriate chart area.    PE: /86 (BP Location: Right arm, Patient Position: Sitting, Cuff Size: Adult Regular)   Pulse 85   Wt 87.3 kg (192 lb 6.4 oz)   LMP 12/08/2022 (Exact Date)   BMI 29.25 kg/m    Body mass index is 29.25 kg/m .    General Appearance:  " healthy, alert, active, no distress  HEENT: NCAT    A/P      ICD-10-CM    1. Hyperparathyroidism due to vitamin D deficiency (H)  E21.1 US Pelvic Transabdominal and Transvaginal      2. Primary hypertension  I10 US Pelvic Transabdominal and Transvaginal      3. Irregular menses  N92.6 US Pelvic Transabdominal and Transvaginal      4. Hyperaldosteronism (H)  E26.9 US Pelvic Transabdominal and Transvaginal        Patient and I had an extensive discussion regarding the above diagnosis.  I explained that the diagnosis most likely is affecting her cycles..  With her hypertension and difficulty in trying to manage her blood pressure during pregnancy and risk of preeclampsia.  I recommend postponing pregnancy until a complete diagnosis has been completed at [facility].  I have also asked her to discuss hormonal contraception with her endocrinologist.  In the meantime I recommend using condoms or other barrier methods so that there is not an unplanned pregnancy.    I will notify her about her pelvic ultrasound results..    Total time preparing to see patient with reviewing prior encounter and labs, face to face time,  and coordinating care on the same calendar date: 45mins  CEPHAS AGBEH, MD.

## 2023-01-25 DIAGNOSIS — E26.9 HYPERALDOSTERONISM (H): Primary | ICD-10-CM

## 2023-02-10 DIAGNOSIS — I10 HYPERTENSION GOAL BP (BLOOD PRESSURE) < 140/90: ICD-10-CM

## 2023-02-11 ENCOUNTER — HEALTH MAINTENANCE LETTER (OUTPATIENT)
Age: 28
End: 2023-02-11

## 2023-02-14 RX ORDER — POTASSIUM CHLORIDE 1500 MG/1
TABLET, EXTENDED RELEASE ORAL
Qty: 360 TABLET | Refills: 2 | Status: SHIPPED | OUTPATIENT
Start: 2023-02-14 | End: 2023-08-25

## 2023-02-25 DIAGNOSIS — I10 HYPERTENSION GOAL BP (BLOOD PRESSURE) < 140/90: ICD-10-CM

## 2023-02-27 ENCOUNTER — LAB (OUTPATIENT)
Dept: LAB | Facility: CLINIC | Age: 28
End: 2023-02-27
Payer: COMMERCIAL

## 2023-02-27 DIAGNOSIS — E26.9 HYPERALDOSTERONISM (H): ICD-10-CM

## 2023-02-27 PROCEDURE — 36415 COLL VENOUS BLD VENIPUNCTURE: CPT

## 2023-02-27 PROCEDURE — 80048 BASIC METABOLIC PNL TOTAL CA: CPT

## 2023-02-27 RX ORDER — AMLODIPINE BESYLATE 5 MG/1
TABLET ORAL
Qty: 60 TABLET | Refills: 2 | OUTPATIENT
Start: 2023-02-27

## 2023-02-28 LAB
ANION GAP SERPL CALCULATED.3IONS-SCNC: 5 MMOL/L (ref 3–14)
BUN SERPL-MCNC: 11 MG/DL (ref 7–30)
CALCIUM SERPL-MCNC: 9.1 MG/DL (ref 8.5–10.1)
CHLORIDE BLD-SCNC: 104 MMOL/L (ref 94–109)
CO2 SERPL-SCNC: 30 MMOL/L (ref 20–32)
CREAT SERPL-MCNC: 0.7 MG/DL (ref 0.52–1.04)
GFR SERPL CREATININE-BSD FRML MDRD: >90 ML/MIN/1.73M2
GLUCOSE BLD-MCNC: 106 MG/DL (ref 70–99)
POTASSIUM BLD-SCNC: 2.9 MMOL/L (ref 3.4–5.3)
SODIUM SERPL-SCNC: 139 MMOL/L (ref 133–144)

## 2023-03-01 ENCOUNTER — TELEPHONE (OUTPATIENT)
Dept: ENDOCRINOLOGY | Facility: CLINIC | Age: 28
End: 2023-03-01

## 2023-03-02 DIAGNOSIS — E26.9 HYPERALDOSTERONISM (H): Primary | ICD-10-CM

## 2023-03-03 ENCOUNTER — LAB (OUTPATIENT)
Dept: LAB | Facility: CLINIC | Age: 28
End: 2023-03-03
Payer: COMMERCIAL

## 2023-03-03 DIAGNOSIS — E26.9 HYPERALDOSTERONISM (H): ICD-10-CM

## 2023-03-03 LAB
ANION GAP SERPL CALCULATED.3IONS-SCNC: 1 MMOL/L (ref 3–14)
BUN SERPL-MCNC: 7 MG/DL (ref 7–30)
CALCIUM SERPL-MCNC: 8.9 MG/DL (ref 8.5–10.1)
CHLORIDE BLD-SCNC: 110 MMOL/L (ref 94–109)
CO2 SERPL-SCNC: 30 MMOL/L (ref 20–32)
CREAT SERPL-MCNC: 0.54 MG/DL (ref 0.52–1.04)
GFR SERPL CREATININE-BSD FRML MDRD: >90 ML/MIN/1.73M2
GLUCOSE BLD-MCNC: 136 MG/DL (ref 70–99)
POTASSIUM BLD-SCNC: 3.3 MMOL/L (ref 3.4–5.3)
SODIUM SERPL-SCNC: 141 MMOL/L (ref 133–144)

## 2023-03-03 PROCEDURE — 80048 BASIC METABOLIC PNL TOTAL CA: CPT

## 2023-03-03 PROCEDURE — 36415 COLL VENOUS BLD VENIPUNCTURE: CPT

## 2023-03-09 DIAGNOSIS — E26.9 HYPERALDOSTERONISM (H): Primary | ICD-10-CM

## 2023-03-09 RX ORDER — SPIRONOLACTONE 50 MG/1
50 TABLET, FILM COATED ORAL DAILY
Qty: 90 TABLET | Refills: 1 | Status: SHIPPED | OUTPATIENT
Start: 2023-03-09 | End: 2023-08-25

## 2023-03-20 ENCOUNTER — DOCUMENTATION ONLY (OUTPATIENT)
Dept: ENDOCRINOLOGY | Facility: CLINIC | Age: 28
End: 2023-03-20
Payer: COMMERCIAL

## 2023-03-20 NOTE — PROGRESS NOTES
Will have this formally scanned in by the medical records department to improve legibility.

## 2023-05-22 ENCOUNTER — TELEPHONE (OUTPATIENT)
Dept: ENDOCRINOLOGY | Facility: CLINIC | Age: 28
End: 2023-05-22
Payer: COMMERCIAL

## 2023-05-22 DIAGNOSIS — I10 HYPERTENSION GOAL BP (BLOOD PRESSURE) < 140/90: ICD-10-CM

## 2023-05-24 RX ORDER — LOSARTAN POTASSIUM 100 MG/1
100 TABLET ORAL DAILY
Qty: 90 TABLET | Refills: 1 | Status: SHIPPED | OUTPATIENT
Start: 2023-05-24 | End: 2023-08-25

## 2023-05-24 NOTE — TELEPHONE ENCOUNTER
Last Clinic Visit:11/18/2022  [facility]    K+ last done in  3/6/23 -WNL-4.1    Warnings Override History for losartan (COZAAR) 100 MG tablet [[id number]]    Overridden by Elvis Sanchez MD on Mar 9, 2023 1:23 PM   Drug-Drug   1. POTASSIUM-SPARING DIURETICS / ANGIOTENSIN II RECEPTOR ANTAGONISTS [Level: Major]   Other Orders: spironolactone (ALDACTONE) 50 MG tablet

## 2023-06-07 DIAGNOSIS — E26.9 HYPERALDOSTERONISM (H): Primary | ICD-10-CM

## 2023-06-08 ENCOUNTER — DOCUMENTATION ONLY (OUTPATIENT)
Dept: LAB | Facility: CLINIC | Age: 28
End: 2023-06-08
Payer: COMMERCIAL

## 2023-06-08 NOTE — PROGRESS NOTES
"Lab notes state \"Potassium, aldosterone, BG \"    Please order future labs for 6-9 appointment. Thanks, Sherry CHENT  "

## 2023-06-09 ENCOUNTER — LAB (OUTPATIENT)
Dept: LAB | Facility: CLINIC | Age: 28
End: 2023-06-09
Payer: COMMERCIAL

## 2023-06-09 DIAGNOSIS — E26.9 HYPERALDOSTERONISM (H): ICD-10-CM

## 2023-06-09 LAB
ANION GAP SERPL CALCULATED.3IONS-SCNC: 10 MMOL/L (ref 7–15)
BUN SERPL-MCNC: 13.8 MG/DL (ref 6–20)
CALCIUM SERPL-MCNC: 9.6 MG/DL (ref 8.6–10)
CHLORIDE SERPL-SCNC: 101 MMOL/L (ref 98–107)
CREAT SERPL-MCNC: 0.77 MG/DL (ref 0.51–0.95)
DEPRECATED HCO3 PLAS-SCNC: 24 MMOL/L (ref 22–29)
GFR SERPL CREATININE-BSD FRML MDRD: >90 ML/MIN/1.73M2
GLUCOSE SERPL-MCNC: 71 MG/DL (ref 70–99)
POTASSIUM SERPL-SCNC: 4.4 MMOL/L (ref 3.4–5.3)
SODIUM SERPL-SCNC: 135 MMOL/L (ref 136–145)

## 2023-06-09 PROCEDURE — 80048 BASIC METABOLIC PNL TOTAL CA: CPT

## 2023-06-09 PROCEDURE — 36415 COLL VENOUS BLD VENIPUNCTURE: CPT

## 2023-06-15 ENCOUNTER — LAB (OUTPATIENT)
Dept: LAB | Facility: CLINIC | Age: 28
End: 2023-06-15
Payer: COMMERCIAL

## 2023-06-15 DIAGNOSIS — E26.9 HYPERALDOSTERONISM (H): ICD-10-CM

## 2023-06-15 LAB
ANION GAP SERPL CALCULATED.3IONS-SCNC: 12 MMOL/L (ref 7–15)
BUN SERPL-MCNC: 14.8 MG/DL (ref 6–20)
CALCIUM SERPL-MCNC: 9.4 MG/DL (ref 8.6–10)
CHLORIDE SERPL-SCNC: 102 MMOL/L (ref 98–107)
CREAT SERPL-MCNC: 0.86 MG/DL (ref 0.51–0.95)
DEPRECATED HCO3 PLAS-SCNC: 21 MMOL/L (ref 22–29)
GFR SERPL CREATININE-BSD FRML MDRD: >90 ML/MIN/1.73M2
GLUCOSE SERPL-MCNC: 103 MG/DL (ref 70–99)
POTASSIUM SERPL-SCNC: 3.9 MMOL/L (ref 3.4–5.3)
SODIUM SERPL-SCNC: 135 MMOL/L (ref 136–145)

## 2023-06-15 PROCEDURE — 36415 COLL VENOUS BLD VENIPUNCTURE: CPT

## 2023-06-15 PROCEDURE — 80048 BASIC METABOLIC PNL TOTAL CA: CPT

## 2023-06-23 ENCOUNTER — LAB (OUTPATIENT)
Dept: LAB | Facility: CLINIC | Age: 28
End: 2023-06-23
Payer: COMMERCIAL

## 2023-06-23 DIAGNOSIS — E26.9 HYPERALDOSTERONISM (H): ICD-10-CM

## 2023-06-23 LAB
ANION GAP SERPL CALCULATED.3IONS-SCNC: 10 MMOL/L (ref 7–15)
BUN SERPL-MCNC: 13.6 MG/DL (ref 6–20)
CALCIUM SERPL-MCNC: 9.1 MG/DL (ref 8.6–10)
CHLORIDE SERPL-SCNC: 106 MMOL/L (ref 98–107)
CREAT SERPL-MCNC: 0.88 MG/DL (ref 0.51–0.95)
DEPRECATED HCO3 PLAS-SCNC: 22 MMOL/L (ref 22–29)
GFR SERPL CREATININE-BSD FRML MDRD: >90 ML/MIN/1.73M2
GLUCOSE SERPL-MCNC: 91 MG/DL (ref 70–99)
POTASSIUM SERPL-SCNC: 4.2 MMOL/L (ref 3.4–5.3)
SODIUM SERPL-SCNC: 138 MMOL/L (ref 136–145)

## 2023-06-23 PROCEDURE — 36415 COLL VENOUS BLD VENIPUNCTURE: CPT

## 2023-06-23 PROCEDURE — 80048 BASIC METABOLIC PNL TOTAL CA: CPT

## 2023-06-30 ENCOUNTER — LAB (OUTPATIENT)
Dept: LAB | Facility: CLINIC | Age: 28
End: 2023-06-30
Payer: COMMERCIAL

## 2023-06-30 DIAGNOSIS — E26.9 HYPERALDOSTERONISM (H): ICD-10-CM

## 2023-06-30 LAB
ANION GAP SERPL CALCULATED.3IONS-SCNC: 11 MMOL/L (ref 7–15)
BUN SERPL-MCNC: 14.4 MG/DL (ref 6–20)
CALCIUM SERPL-MCNC: 9.2 MG/DL (ref 8.6–10)
CHLORIDE SERPL-SCNC: 105 MMOL/L (ref 98–107)
CREAT SERPL-MCNC: 0.92 MG/DL (ref 0.51–0.95)
DEPRECATED HCO3 PLAS-SCNC: 23 MMOL/L (ref 22–29)
GFR SERPL CREATININE-BSD FRML MDRD: 87 ML/MIN/1.73M2
GLUCOSE SERPL-MCNC: 100 MG/DL (ref 70–99)
POTASSIUM SERPL-SCNC: 4.3 MMOL/L (ref 3.4–5.3)
SODIUM SERPL-SCNC: 139 MMOL/L (ref 136–145)

## 2023-06-30 PROCEDURE — 80048 BASIC METABOLIC PNL TOTAL CA: CPT

## 2023-06-30 PROCEDURE — 36415 COLL VENOUS BLD VENIPUNCTURE: CPT

## 2023-07-05 DIAGNOSIS — E26.9 HYPERALDOSTERONISM (H): Primary | ICD-10-CM

## 2023-07-05 DIAGNOSIS — E21.3 HYPERPARATHYROIDISM (H): ICD-10-CM

## 2023-07-05 DIAGNOSIS — E55.9 VITAMIN D DEFICIENCY: ICD-10-CM

## 2023-07-05 DIAGNOSIS — I10 HYPERTENSION GOAL BP (BLOOD PRESSURE) < 140/90: ICD-10-CM

## 2023-07-05 DIAGNOSIS — R94.6 THYROID FUNCTION TEST ABNORMAL: ICD-10-CM

## 2023-08-25 ENCOUNTER — OFFICE VISIT (OUTPATIENT)
Dept: FAMILY MEDICINE | Facility: CLINIC | Age: 28
End: 2023-08-25
Attending: FAMILY MEDICINE
Payer: COMMERCIAL

## 2023-08-25 ENCOUNTER — LAB (OUTPATIENT)
Dept: LAB | Facility: CLINIC | Age: 28
End: 2023-08-25
Attending: FAMILY MEDICINE
Payer: COMMERCIAL

## 2023-08-25 VITALS
SYSTOLIC BLOOD PRESSURE: 121 MMHG | HEIGHT: 68 IN | DIASTOLIC BLOOD PRESSURE: 84 MMHG | HEART RATE: 81 BPM | WEIGHT: 197.9 LBS | BODY MASS INDEX: 29.99 KG/M2

## 2023-08-25 DIAGNOSIS — D64.9 NORMOCYTIC ANEMIA: ICD-10-CM

## 2023-08-25 DIAGNOSIS — Z31.69 ENCOUNTER FOR PRECONCEPTION CONSULTATION: Primary | ICD-10-CM

## 2023-08-25 DIAGNOSIS — Z31.69 ENCOUNTER FOR PRECONCEPTION CONSULTATION: ICD-10-CM

## 2023-08-25 PROBLEM — E26.09 PRIMARY ALDOSTERONISM (H): Status: ACTIVE | Noted: 2023-03-06

## 2023-08-25 PROBLEM — R94.6 ABNORMAL RESULTS OF THYROID FUNCTION STUDIES: Status: RESOLVED | Noted: 2021-10-05 | Resolved: 2023-08-25

## 2023-08-25 LAB
ANION GAP SERPL CALCULATED.3IONS-SCNC: 10 MMOL/L (ref 7–15)
BUN SERPL-MCNC: 12.7 MG/DL (ref 6–20)
CALCIUM SERPL-MCNC: 9.3 MG/DL (ref 8.6–10)
CHLORIDE SERPL-SCNC: 105 MMOL/L (ref 98–107)
CREAT SERPL-MCNC: 0.79 MG/DL (ref 0.51–0.95)
DEPRECATED HCO3 PLAS-SCNC: 24 MMOL/L (ref 22–29)
ERYTHROCYTE [DISTWIDTH] IN BLOOD BY AUTOMATED COUNT: 13.8 % (ref 10–15)
FERRITIN SERPL-MCNC: 78 NG/ML (ref 6–175)
GFR SERPL CREATININE-BSD FRML MDRD: >90 ML/MIN/1.73M2
GLUCOSE SERPL-MCNC: 82 MG/DL (ref 70–99)
HCT VFR BLD AUTO: 34 % (ref 35–47)
HGB BLD-MCNC: 11.5 G/DL (ref 11.7–15.7)
MCH RBC QN AUTO: 29.7 PG (ref 26.5–33)
MCHC RBC AUTO-ENTMCNC: 33.8 G/DL (ref 31.5–36.5)
MCV RBC AUTO: 88 FL (ref 78–100)
PLATELET # BLD AUTO: 296 10E3/UL (ref 150–450)
POTASSIUM SERPL-SCNC: 3.9 MMOL/L (ref 3.4–5.3)
RBC # BLD AUTO: 3.87 10E6/UL (ref 3.8–5.2)
SODIUM SERPL-SCNC: 139 MMOL/L (ref 136–145)
TSH SERPL DL<=0.005 MIU/L-ACNC: 0.91 UIU/ML (ref 0.3–4.2)
WBC # BLD AUTO: 4.6 10E3/UL (ref 4–11)

## 2023-08-25 PROCEDURE — 99203 OFFICE O/P NEW LOW 30 MIN: CPT | Performed by: FAMILY MEDICINE

## 2023-08-25 PROCEDURE — 85027 COMPLETE CBC AUTOMATED: CPT

## 2023-08-25 PROCEDURE — 87389 HIV-1 AG W/HIV-1&-2 AB AG IA: CPT

## 2023-08-25 PROCEDURE — 86762 RUBELLA ANTIBODY: CPT

## 2023-08-25 PROCEDURE — 86803 HEPATITIS C AB TEST: CPT

## 2023-08-25 PROCEDURE — 36415 COLL VENOUS BLD VENIPUNCTURE: CPT

## 2023-08-25 PROCEDURE — 82306 VITAMIN D 25 HYDROXY: CPT

## 2023-08-25 PROCEDURE — 87340 HEPATITIS B SURFACE AG IA: CPT

## 2023-08-25 PROCEDURE — 86787 VARICELLA-ZOSTER ANTIBODY: CPT

## 2023-08-25 PROCEDURE — 99213 OFFICE O/P EST LOW 20 MIN: CPT | Performed by: FAMILY MEDICINE

## 2023-08-25 PROCEDURE — 84443 ASSAY THYROID STIM HORMONE: CPT

## 2023-08-25 PROCEDURE — 82728 ASSAY OF FERRITIN: CPT

## 2023-08-25 PROCEDURE — 86706 HEP B SURFACE ANTIBODY: CPT

## 2023-08-25 PROCEDURE — 80048 BASIC METABOLIC PNL TOTAL CA: CPT

## 2023-08-25 ASSESSMENT — PAIN SCALES - GENERAL: PAINLEVEL: NO PAIN (0)

## 2023-08-25 NOTE — PROGRESS NOTES
"CC: RECHECK      SUBJECTIVE: Zain is a 27 year old G0 who comes in with her  to discuss family planning. They are thinking about pregnancy in the next year.     She was diagnosed with primary aldosteronism and is s/p left adrenalectomy on 5/30/2023.     She also has a history of low vitamin D level. But not currently taking any vitamin D supplement.   She has a history of abnormal TSH, but on recheck it was normal and she had a negative TPO antibody.   She has a history of Latent TB - treated in 2018/2019.   She had HTN prior to adrenalectomy, but now BP has been normal off medications.     Last pap smear was 2/2021 and it was normal. No history of abnormal pap smears. No history of STIs.      She is not currently taking a prenatal vitamin.   Current contraception: none  Number of partners in last year: 1  Male - History of pregnancies with other partners.  None    Cycles are 45 days long and regular. No pain with menses. First 2 days are moderately heavy, then get lighter. Menses last for 4-5 days.   This past week, had pain with insertion during intercourse, but pain gradually improved. Helped to use lubricant.   No pain with urination or urinary incontinence.   Has a BM every 2-3 days, soft and easy to pass.       OBJECTIVE:   /84   Pulse 81   Ht 1.727 m (5' 7.99\")   Wt 89.8 kg (197 lb 14.4 oz)   LMP 08/08/2023   BMI 30.10 kg/m    General: Alert and oriented in no acute distress.  Psych: Mood and affect appropriate.      ASSESSMENT/PLAN:   Philipp was seen today for Encounter for preconception consultation.   Discussed initial labs to re-evaluate thyroid, vitamin D, and electrolytes given past history. Check for rubella, varicella, and hep B immunity. Check for anemia. Recommend starting a prenatal vitamin. She has longer cycles -- consider OPK or BBT to confirm ovulation. If not ovulating, RTC for re-evaluation. Otherwise, discussed it is normal to take up to 12 months to get pregnant.   - "     Basic Metabolic Panel; Future  -     TSH - Reflex to FT4; Future  -     25 Hydroxyvitamin D2 and D3; Future  -     Rubella Antibody IgG; Future  -     Varicella Zoster Virus Antibody IgG; Future  -     CBC with Platelets; Future  -     Hepatitis B Surface Antibody; Future  -     Hepatitis B surface antigen; Future  -     HIV Antigen Antibody Combo Cascade; Future  -     Hepatitis C Screen Reflex to HCV RNA Quant and Genotype; Future        Dian Curry MD  Family Medicine    Addendum - CBC shows anemia. Add on ferritin level.

## 2023-08-26 LAB
HBV SURFACE AG SERPL QL IA: NONREACTIVE
HIV 1+2 AB+HIV1 P24 AG SERPL QL IA: NONREACTIVE

## 2023-08-27 LAB
HBV SURFACE AB SERPL IA-ACNC: 299.08 M[IU]/ML
HBV SURFACE AB SERPL IA-ACNC: REACTIVE M[IU]/ML
HCV AB SERPL QL IA: NONREACTIVE

## 2023-08-28 LAB
RUBV IGG SERPL QL IA: 23.6 INDEX
RUBV IGG SERPL QL IA: POSITIVE
VZV IGG SER QL IA: >4000 INDEX
VZV IGG SER QL IA: POSITIVE

## 2023-08-29 LAB
DEPRECATED CALCIDIOL+CALCIFEROL SERPL-MC: <36 UG/L (ref 20–75)
VITAMIN D2 SERPL-MCNC: <5 UG/L
VITAMIN D3 SERPL-MCNC: 31 UG/L

## 2023-09-08 ENCOUNTER — E-VISIT (OUTPATIENT)
Dept: FAMILY MEDICINE | Facility: CLINIC | Age: 28
End: 2023-09-08
Payer: COMMERCIAL

## 2023-09-08 DIAGNOSIS — Z53.9 DIAGNOSIS NOT YET DEFINED: Primary | ICD-10-CM

## 2023-10-03 ENCOUNTER — VIRTUAL VISIT (OUTPATIENT)
Dept: ENDOCRINOLOGY | Facility: CLINIC | Age: 28
End: 2023-10-03
Payer: COMMERCIAL

## 2023-10-03 DIAGNOSIS — E26.9 HYPERALDOSTERONISM (H): ICD-10-CM

## 2023-10-03 DIAGNOSIS — E66.3 OVERWEIGHT (BMI 25.0-29.9): ICD-10-CM

## 2023-10-03 DIAGNOSIS — E21.3 HYPERPARATHYROIDISM (H): ICD-10-CM

## 2023-10-03 DIAGNOSIS — N91.5 OLIGOMENORRHEA, UNSPECIFIED TYPE: ICD-10-CM

## 2023-10-03 DIAGNOSIS — E89.6 H/O TOTAL ADRENALECTOMY (H): ICD-10-CM

## 2023-10-03 PROCEDURE — 99214 OFFICE O/P EST MOD 30 MIN: CPT | Mod: VID | Performed by: INTERNAL MEDICINE

## 2023-10-03 NOTE — PROGRESS NOTES
Video visit    Start time 3:43, stop time 4:00; additional 15 minutes spent on the date of the encounter doing chart review, documentation and further activities as noted.     Provider location: [facility], [address] [place] [address] [address], [place] [address]    Patient location: patient home    Mode of transmission: video     Subjective:    Established patient    Philipp June is a 27 year old female who presents for follow-up after she underwent adrenalectomy for primary aldosteronism.  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    # Primary aldosteronism status post left adrenalectomy 5/30/2023    She currently takes no medications outside of a prenatal multivitamin.  She is not on a potassium supplement.  She has not checked her blood pressure for 1 month or so but when she was monitoring her blood pressure routinely postop it was around 130/90.    She notes that following adrenalectomy energy has been much improved.  Appetite is good.    # Oligomenorrhea    Menarche was at age 16.  She has never used hormonal contraception.  Her menstrual cycles were always normal until March 2022 and from that time to date she averages 1 menstrual period about every 2 months.  Last menstrual period August 8, 2023.    No prior pregnancies but she is currently trying for pregnancy.    No prior pelvic ultrasound.    Denies: acne, hair loss, hirsutism, deepening of voice, increase in muscle mass.    No prior diagnosis of PCOS.    She is 5 foot 8 and 192 pounds with a BMI of 29.2 kg/meters squared.  Weight has been steady.  She denies features suggestive of Cushing's.    No history of diabetes mellitus or dyslipidemia.  She denies snoring.    Objective:    Video visit.    Assessment/Plan:    # Primary aldosteronism status post left adrenalectomy 5/30/2023    I recommend that she checks her blood pressure a few times per week and record the values.    #  Oligomenorrhea    I ordered standard labs for evaluation. Zain will let me know when complete and we'll set up a time to review the results.

## 2023-10-06 ENCOUNTER — TELEPHONE (OUTPATIENT)
Dept: FAMILY MEDICINE | Facility: CLINIC | Age: 28
End: 2023-10-06
Payer: COMMERCIAL

## 2023-10-06 NOTE — TELEPHONE ENCOUNTER
Patient is on my schedule next week, needs to see Ob/gyn for her concerns.     Please reschedule.       Kenny Nowak PA-C

## 2023-10-09 ENCOUNTER — MYC MEDICAL ADVICE (OUTPATIENT)
Dept: ENDOCRINOLOGY | Facility: CLINIC | Age: 28
End: 2023-10-09
Payer: COMMERCIAL

## 2023-10-09 NOTE — TELEPHONE ENCOUNTER
Are you able to possibly assist in helping patient get scheduled with OB? I just know I'm not supposed to schedule those visits. I call call and give scheduling number but about as much as I can do.          Gianni Huerta

## 2023-10-09 NOTE — TELEPHONE ENCOUNTER
Please provide patient with OBGYN scheduling number [phone] to assist with appointment.    Ana Maria Tenorio CMA

## 2023-10-13 ENCOUNTER — LAB (OUTPATIENT)
Dept: LAB | Facility: CLINIC | Age: 28
End: 2023-10-13
Payer: COMMERCIAL

## 2023-10-13 DIAGNOSIS — E26.9 HYPERALDOSTERONISM (H): ICD-10-CM

## 2023-10-13 DIAGNOSIS — I10 HYPERTENSION GOAL BP (BLOOD PRESSURE) < 140/90: ICD-10-CM

## 2023-10-13 DIAGNOSIS — N91.5 OLIGOMENORRHEA, UNSPECIFIED TYPE: ICD-10-CM

## 2023-10-13 DIAGNOSIS — R94.6 THYROID FUNCTION TEST ABNORMAL: ICD-10-CM

## 2023-10-13 DIAGNOSIS — E21.3 HYPERPARATHYROIDISM (H): ICD-10-CM

## 2023-10-13 DIAGNOSIS — E55.9 VITAMIN D DEFICIENCY: ICD-10-CM

## 2023-10-13 DIAGNOSIS — E66.3 OVERWEIGHT (BMI 25.0-29.9): ICD-10-CM

## 2023-10-13 DIAGNOSIS — E89.6 H/O TOTAL ADRENALECTOMY (H): ICD-10-CM

## 2023-10-13 LAB
CHOLEST SERPL-MCNC: 193 MG/DL
CORTIS SERPL-MCNC: 7.1 UG/DL
FASTING STATUS PATIENT QL REPORTED: YES
FSH SERPL IRP2-ACNC: 6.9 MIU/ML
GLUCOSE SERPL-MCNC: 82 MG/DL (ref 70–99)
HBA1C MFR BLD: 5.5 % (ref 0–5.6)
HCG UR QL: NEGATIVE
HDLC SERPL-MCNC: 66 MG/DL
LDLC SERPL CALC-MCNC: 118 MG/DL
MIS SERPL-MCNC: 2.52 NG/ML (ref 0.89–9.9)
NONHDLC SERPL-MCNC: 127 MG/DL
PROLACTIN SERPL 3RD IS-MCNC: 16 NG/ML (ref 5–23)
PTH-INTACT SERPL-MCNC: 51 PG/ML (ref 15–65)
SHBG SERPL-SCNC: 28 NMOL/L (ref 30–135)
TRIGL SERPL-MCNC: 47 MG/DL
TSH SERPL DL<=0.005 MIU/L-ACNC: 0.95 UIU/ML (ref 0.3–4.2)

## 2023-10-13 PROCEDURE — 82157 ASSAY OF ANDROSTENEDIONE: CPT | Mod: 90

## 2023-10-13 PROCEDURE — 83036 HEMOGLOBIN GLYCOSYLATED A1C: CPT

## 2023-10-13 PROCEDURE — 84270 ASSAY OF SEX HORMONE GLOBUL: CPT

## 2023-10-13 PROCEDURE — 84244 ASSAY OF RENIN: CPT | Mod: 90

## 2023-10-13 PROCEDURE — 82533 TOTAL CORTISOL: CPT

## 2023-10-13 PROCEDURE — 82627 DEHYDROEPIANDROSTERONE: CPT

## 2023-10-13 PROCEDURE — 82088 ASSAY OF ALDOSTERONE: CPT

## 2023-10-13 PROCEDURE — 99000 SPECIMEN HANDLING OFFICE-LAB: CPT

## 2023-10-13 PROCEDURE — 36415 COLL VENOUS BLD VENIPUNCTURE: CPT

## 2023-10-13 PROCEDURE — 83520 IMMUNOASSAY QUANT NOS NONAB: CPT

## 2023-10-13 PROCEDURE — 84403 ASSAY OF TOTAL TESTOSTERONE: CPT

## 2023-10-13 PROCEDURE — 84146 ASSAY OF PROLACTIN: CPT

## 2023-10-13 PROCEDURE — 84443 ASSAY THYROID STIM HORMONE: CPT

## 2023-10-13 PROCEDURE — 80061 LIPID PANEL: CPT

## 2023-10-13 PROCEDURE — 83970 ASSAY OF PARATHORMONE: CPT

## 2023-10-13 PROCEDURE — 83498 ASY HYDROXYPROGESTERONE 17-D: CPT

## 2023-10-13 PROCEDURE — 81025 URINE PREGNANCY TEST: CPT

## 2023-10-13 PROCEDURE — 82947 ASSAY GLUCOSE BLOOD QUANT: CPT

## 2023-10-13 PROCEDURE — 83001 ASSAY OF GONADOTROPIN (FSH): CPT

## 2023-10-16 ENCOUNTER — TELEPHONE (OUTPATIENT)
Dept: ENDOCRINOLOGY | Facility: CLINIC | Age: 28
End: 2023-10-16
Payer: COMMERCIAL

## 2023-10-16 LAB
DHEA-S SERPL-MCNC: 188 UG/DL (ref 35–430)
RENIN PLAS-CCNC: 0.3 NG/ML/HR

## 2023-10-16 NOTE — CONFIDENTIAL NOTE
Patient reached out to the [facility] to get all lab results from 10/13/23. RN stated the provider would need to view these and then go through them with her. There was a future release date on these so this RN offered to put in a note to send to her Dr. Sanchez to view. She declined and stated she would reach out to him on her own.     Kassandra Byrne RN on 10/16/2023 at 1:59 PM

## 2023-10-17 LAB
ALDOST SERPL-MCNC: 12.5 NG/DL (ref 0–31)
ANDROST SERPL-MCNC: 1.33 NG/ML

## 2023-10-18 LAB
TESTOST FREE SERPL-MCNC: 0.87 NG/DL
TESTOST SERPL-MCNC: 44 NG/DL (ref 8–60)

## 2023-10-22 LAB — 17OHP SERPL-MCNC: 37 NG/DL

## 2023-10-23 ENCOUNTER — VIRTUAL VISIT (OUTPATIENT)
Dept: ENDOCRINOLOGY | Facility: CLINIC | Age: 28
End: 2023-10-23
Payer: COMMERCIAL

## 2023-10-23 DIAGNOSIS — E89.6 H/O TOTAL ADRENALECTOMY (H): ICD-10-CM

## 2023-10-23 DIAGNOSIS — E26.9 HYPERALDOSTERONISM (H): Primary | ICD-10-CM

## 2023-10-23 DIAGNOSIS — E28.2 PCOS (POLYCYSTIC OVARIAN SYNDROME): ICD-10-CM

## 2023-10-23 DIAGNOSIS — Z31.69 PRE-CONCEPTION COUNSELING: ICD-10-CM

## 2023-10-23 DIAGNOSIS — E66.3 OVERWEIGHT (BMI 25.0-29.9): ICD-10-CM

## 2023-10-23 DIAGNOSIS — N91.5 OLIGOMENORRHEA, UNSPECIFIED TYPE: ICD-10-CM

## 2023-10-23 DIAGNOSIS — I10 HYPERTENSION, UNSPECIFIED TYPE: ICD-10-CM

## 2023-10-23 DIAGNOSIS — E28.8 HYPERANDROGENISM: ICD-10-CM

## 2023-10-23 PROCEDURE — 99214 OFFICE O/P EST MOD 30 MIN: CPT | Mod: VID | Performed by: INTERNAL MEDICINE

## 2023-10-23 NOTE — PROGRESS NOTES
Video visit    Start time 11:32, stop time 11:52; additional 20 minutes spent on the date of the encounter doing chart review, documentation and further activities as noted.     Provider location: [facility], [address] [place] [address] [address], [place] [address]    Patient location: patient home    Mode of transmission: video     Subjective:    Established patient    Philipp June is a 27 year old female who presents for follow-up after she underwent adrenalectomy for primary aldosteronism.  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 The following is a comprehensive summary of her Endocrine care to date.      # Primary aldosteronism due to left adrenal gland disease status post left adrenalectomy 5/30/2023  -Dr. 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      6/28/2022: serum aldosterone 58.5 ng/dL with paired PRA <0.1 ng/mL/hr       She has had intermittent hypokalemia. First ever labs done 2/2021 showed hypokalemia. GFR normal.     Because of spontaneous hypokalemia, and because her aldosterone is >= 20 ng/dL with suppressed PRA, this confirmed the diagnosis of primary aldosteronism and confirmatory testing is not needed.    CT abdomen 6/28/2022 and again 8/5/2022: I reviewed the images and agree with radiology that the adrenal glands appear normal. There was an incidental finding of a small fat-containing umbilical hernia.     TTE 3/2022: normal LVEF, there is LVH - she has seen a cardiologist for this     BP medications as of 6/23/2022:  -Losartan 100 mg daily monotherapy    -Potassium chloride 40 mEq BID    10/17/2022 she underwent AVS at [facility] with Dr. Wen Maher - results were indeterminate, during AVS she was on amlodipine 5 mg and losartan 100 mg as well as potassium supplementation.      Current BP medications as of 12/12/2022:  -Amlodipine 5 mg daily  -Losartan 100 mg daily monotherapy   -Potassium chloride 40 mEq BID  -BP on the above regimen have been around 135/90s     Because of the indeterminate results 10/17/2022 I referred her for repeat AVS at [facility] done 3/7/2023 - confirmed left adrenal disease, see Dr. Carpio's note from 3/8/2023 for details.      5/30/2023: Dr. 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     10/3/2023: She currently takes no medications outside of a prenatal multivitamin.  She is not on a potassium supplement.  She has not checked her blood pressure for 1 month or so but when she was monitoring her blood pressure routinely postop it was around 130/90. She notes that following adrenalectomy energy has been much improved.  Appetite is good.    10/13/2023: serum aldosterone 12.5 ng/dL, PRA 0.3 ng/mL/hr, 8 AM serum cortisol 7.1 mcg/dL, DHEA-S 188 mcg/dL     10/23/2023: She currently takes no medications outside of a prenatal multivitamin.  She is not on a potassium supplement. She checked her BP 3 times over the past week: 133/91, 131/97, and 137/96.     No FH of hypertension. No CVD in the family.     # PCOS     Menarche was at age 16.  She has never used hormonal contraception.  Her menstrual cycles were always normal until March 2022 and from that time to date she averages 1 menstrual period about every 2 months.  Last menstrual period was 10/4/2023 - 10/8/2023 and before that was August 8, 2023.     No prior  pregnancies but she is currently trying for pregnancy (~5 months).     No prior pelvic ultrasound.     Denies: acne, hair loss, hirsutism, deepening of voice, increase in muscle mass.     She is 5 foot 8 and 192 pounds with a BMI of 29.2 kg/meters squared.  Weight has been steady.  She denies features suggestive of Cushing's.     No history of diabetes mellitus.  She denies snoring.    10/13/2023 at 8:26 AM:  -FSH premenopausal   -17-OH progesterone 37 ng/dL   -total testosterone 44 ng/dL, free testosterone mildly elevated  -androstenedione normal, DHEA-S normal  -lipids: , TG 47, HDL 66,   -pregnancy test negative   -AMH 2.52 (ref range 0.890 - 9.900 ng/mL)  -prolactin normal   -TSH 0.95  -FPG 82 mg/dL, HbA1c 5.5%    She saw OB-GYN 1/20/2023 - Dr. Agbeh      # Secondary hyperparathyroidism due to vitamin D deficiency and inadequate calcium intake; resolved   -No prior hypercalcemia, serum phosphorus previously normal  -10/5/2021: serum calcium mildly low, vitamin D low, PTH mildly elevated   -2/2022: vitamin D 49 (ref range 20 - 75 mcg/L)     No prior nephrolithiasis. No prior fracture.    6/28/2022: GFR >90, minerals normal, 25-OH vitamin D 61 (ref range 20 - 75 mcg/L), PTH 83 (ULN 65 pg/mL)      8/2023: 25-OH vitamin D mid normal     10/2023: PTH mid normal     10/23/2023: she takes a prenatal MVI daily, no other supplements.      # Thyroid function     First time TFT was checked 8/2021 (she was asymptomatic, this was a routine screening test): TSH 0.345 (ref range 0.358 - 4.74), T4/T3 both normal     10/2021: Tg Ab, TSI, TPO all undetectable      Subsequently TSH always normal, most recently checked 10/2023.      No prior neck surgery. No prior H/N radiation. No FH of thyroid disease. No known thyroid nodule. No prior thyroid ultrasound.     No tobacco use.     No endocrinopathies in the family.      She takes biotin.     Objective:    Virtual visit today.     6/2022: /122, BMI 27.69 kg/m2. No  Cushingoid features. Sclera white. No thyroid eye disease. Thyroid examined and normal. No cervical LAD. Radial pulse with a regular rate and rhythm. Breathing comfortably on room air.       Assessment/Plan:    # Primary aldosteronism due to left adrenal gland disease status post left adrenalectomy 5/30/2023  -Dr. 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    She'll track her BP daily for the next 2 weeks and send me a My Chart. I anticipate we may need to restart a BP medication - and note our choice would be in the context of planning for pregnancy.     # PCOS  -Features: oligomenorrhea, mild biochemical hyperandrogenism, difficulty with conception   -She has completed a biochemical PCOS evaluation     Reviewed aiming for 5-10% weight loss (current BMI ~29 kg/m2). Referred to reproductive endocrinology.

## 2023-10-24 ENCOUNTER — OFFICE VISIT (OUTPATIENT)
Dept: OBGYN | Facility: CLINIC | Age: 28
End: 2023-10-24
Payer: COMMERCIAL

## 2023-10-24 VITALS
OXYGEN SATURATION: 100 % | DIASTOLIC BLOOD PRESSURE: 85 MMHG | HEART RATE: 104 BPM | SYSTOLIC BLOOD PRESSURE: 138 MMHG | WEIGHT: 200 LBS | BODY MASS INDEX: 30.31 KG/M2 | HEIGHT: 68 IN

## 2023-10-24 DIAGNOSIS — N92.6 IRREGULAR MENSES: ICD-10-CM

## 2023-10-24 DIAGNOSIS — Z12.4 SCREENING FOR MALIGNANT NEOPLASM OF CERVIX: Primary | ICD-10-CM

## 2023-10-24 PROCEDURE — 99215 OFFICE O/P EST HI 40 MIN: CPT | Performed by: NURSE PRACTITIONER

## 2023-10-24 PROCEDURE — G0145 SCR C/V CYTO,THINLAYER,RESCR: HCPCS | Performed by: NURSE PRACTITIONER

## 2023-10-24 NOTE — PATIENT INSTRUCTIONS
If you have any questions regarding your visit, Please contact your care team.     [facility]: [phone]  To Schedule an Appointment 24/7  Call: [phone]LEPHONE NUM[facility]     Any Macias- APRN CNP      Maria L Jamil-Surgery Scheduler  Olga-Surgery Scheduler         Monday 7:30am-2:00pm    Tuesday 7:30am-4:00pm    Wednesday 7:30am-2:00pm    Thursday 7:30am-11:00am    Friday 7:30am-2:00pm [address] [address]  Phone- [phone]   Fax- [phone]     Imaging Scheduling all locations  [phone]    [facility]  [address] [place] [address]   [place] [address] [phone]         Urgent Care locations:  [facility]   Monday-Friday  10 am - 8 pm  Saturday and Sunday   9 am - 5 pm     [phone]   If you need a medication refill, please contact your pharmacy. Please allow 3 business days for your refill to be completed.  As always, Thank you for trusting us with your healthcare needs!      see additional instructions from your care team below

## 2023-10-24 NOTE — PROGRESS NOTES
Assessment & Plan     Irregular menses  We reviewed her chart notes from Endocrine and work up to date. Discussed her irregular menstrual cycles and we discussed her lab evaluation to now. Recommend she schedule a pelvic ultrasound and discussed rationale. Patient will start home OPK early next week and let me know if no positive within a few days of when her didier says she should be ovulating. If she has a positive ovulation, schedule lab appointment to check progesterone  level. We did discuss possible treatment options for cycle regulation to help ovulation and pregnancy. Also discussed referral to RE as well given her history. For now, would like to complete work up and then decide. Agree with Endocrine that even a mild weight loss would be beneficial. Also discussed possible SA on  to rule out a male factor issue as well. Patient is given an opportunity to ask questions and have them answered.  - US Pelvic Transabdominal and Transvaginal; Future  - Progesterone; Future    Screening for malignant neoplasm of cervix  - Pap imaged thin layer screen reflex to HPV if ASCUS - recommend age 25 - 29    44 minutes spent by me on the date of the encounter doing chart review, history and exam, documentation and further activities per the note     [facility]ER [facility]    Jose Pittman is a 27 year old, presenting for the following health issues:  Menstrual Problem (Irregular menses)    HPI       Irregular menses    Patient has a history of primary aldosteronism and is s/p left adrenalectomy 5/30/23. Has been attempting pregnancy for the last 5 months. Continues to follow with Endocrinology.  Patient has never used hormonal medication, history of cycles being regular about every month until early 2022. Estimates now she has a cycle about once every 2 months. Flows is 5 days, moderate without significant cramping. No intermenstrual bleeding.   Endocrine did a  "hormonal work up on her earlier this month and felt she had biochemical evidence of PCOS, plan was to refer to RE.  Patient presents today.   No history concerning for tubal factor.  and first sexually active with her  about 5 months ago-not sexually active prior to that.    is healthy with no chronic medical conditions. No known exposure to toxins/chemicals, no children of his own.  Patient did home ovulation test after the cycle that began Aug 8. Does use an didier to track cycles/ovulation. Her home ovulation test was positive 2 days after the didier said she ovulated. Has not checked it this cycle, but didier suggests she will ovulate in 19 days.  Patient is due for a pap smear and amenable to completing today.  Taking a PNV. Immune to varicella and Rubella  Endocrine last saw her for VV yesterday. They are having her track her blood pressure and will have her check in with them in 2 weeks, if medication needs to be started, they will use one safe for pregnancy. They have discussed weight loss recommendation for 5-10% with her.     Review of Systems   Constitutional, HEENT, cardiovascular, pulmonary, gi and gu systems are negative, except as otherwise noted.      Objective    /85 (BP Location: Right arm, Patient Position: Sitting, Cuff Size: Adult Regular)   Pulse 104   Ht 1.727 m (5' 8\")   Wt 90.7 kg (200 lb)   LMP 10/04/2023 (Exact Date)   SpO2 100%   BMI 30.41 kg/m    Body mass index is 30.41 kg/m .  Physical Exam   GENERAL: healthy, alert and no distress   (female): normal female external genitalia, normal urethral meatus, vaginal mucosa, normal cervix/adnexa/uterus without masses or discharge  MS: no gross musculoskeletal defects noted, no edema  SKIN: no suspicious lesions or rashes  PSYCH: mentation appears normal, affect normal/bright      "

## 2023-10-25 ENCOUNTER — TELEPHONE (OUTPATIENT)
Dept: ENDOCRINOLOGY | Facility: CLINIC | Age: 28
End: 2023-10-25
Payer: COMMERCIAL

## 2023-10-25 NOTE — TELEPHONE ENCOUNTER
Spoke to patient and gave her phone number for the [facility] ([phone]).  Patient prefers the [place] location.  Office notes faxed to [phone]

## 2023-10-27 LAB
BKR LAB AP GYN ADEQUACY: NORMAL
BKR LAB AP GYN INTERPRETATION: NORMAL
BKR LAB AP HPV REFLEX: NORMAL
BKR LAB AP PREVIOUS ABNORMAL: NORMAL
PATH REPORT.COMMENTS IMP SPEC: NORMAL
PATH REPORT.COMMENTS IMP SPEC: NORMAL
PATH REPORT.RELEVANT HX SPEC: NORMAL

## 2023-10-31 ENCOUNTER — ANCILLARY PROCEDURE (OUTPATIENT)
Dept: ULTRASOUND IMAGING | Facility: CLINIC | Age: 28
End: 2023-10-31
Attending: NURSE PRACTITIONER
Payer: COMMERCIAL

## 2023-10-31 DIAGNOSIS — N92.6 IRREGULAR MENSES: ICD-10-CM

## 2023-10-31 PROCEDURE — 76856 US EXAM PELVIC COMPLETE: CPT | Mod: TC | Performed by: RADIOLOGY

## 2023-10-31 PROCEDURE — 76830 TRANSVAGINAL US NON-OB: CPT | Mod: TC | Performed by: RADIOLOGY

## 2023-11-02 NOTE — TELEPHONE ENCOUNTER
Per 10/31 US result note:  JENNIFER Ryan CNP  11/2/2023  9:10 AM CDT       Please call patient-ultrasound does show several uterine fibroids, but small in size. Would not be causing her fertility concerns. The endometrial lining was a bit thickened, but this may simply be due to where she is in her menstrual cycle. I would like to repeat the ultrasound to recheck the lining and would recommend she schedule it for just as her menstrual bleeding is ending as lining should be thinner. Order has been placed. Thank you. Any RODRIGUEZ CNP     Called and spoke with pt and relayed above result note.  Pt will call imaging scheduling when her period starts and schedule the US for when she stops bleeding.    Kelli Cruz RN

## 2023-11-10 ENCOUNTER — HOSPITAL ENCOUNTER (OUTPATIENT)
Dept: ULTRASOUND IMAGING | Facility: HOSPITAL | Age: 28
Discharge: HOME | End: 2023-11-10
Attending: NURSE PRACTITIONER | Admitting: NURSE PRACTITIONER
Payer: COMMERCIAL

## 2023-11-10 DIAGNOSIS — N92.6 IRREGULAR MENSES: ICD-10-CM

## 2023-11-10 PROCEDURE — 76856 US EXAM PELVIC COMPLETE: CPT

## 2023-11-25 ENCOUNTER — MYC MEDICAL ADVICE (OUTPATIENT)
Dept: OBGYN | Facility: CLINIC | Age: 28
End: 2023-11-25
Payer: COMMERCIAL

## 2023-11-25 DIAGNOSIS — N92.6 IRREGULAR MENSES: Primary | ICD-10-CM

## 2023-11-27 NOTE — TELEPHONE ENCOUNTER
Mychart received from patient reports today is jacqui day 24 & still has not had a positive test.    COPY/PASTED Any's instructions from 11/04/2023 below:     [I would recommend we have you do the ovulation tests this cycle again-start maybe around day 9 or 10 this cycle and continue either until you get a positive test or let me know if you are not getting a positive test within a few weeks, then we will figure out your next step with that progesterone level].     Routed to provider for next steps.    Susan DIAZ RN

## 2023-11-29 ENCOUNTER — LAB (OUTPATIENT)
Dept: LAB | Facility: CLINIC | Age: 28
End: 2023-11-29
Payer: COMMERCIAL

## 2023-11-29 DIAGNOSIS — N92.6 IRREGULAR MENSES: ICD-10-CM

## 2023-11-29 LAB — PROGEST SERPL-MCNC: 0.2 NG/ML

## 2023-11-29 PROCEDURE — 99000 SPECIMEN HANDLING OFFICE-LAB: CPT | Performed by: PATHOLOGY

## 2023-11-29 PROCEDURE — 36415 COLL VENOUS BLD VENIPUNCTURE: CPT | Performed by: PATHOLOGY

## 2023-11-29 PROCEDURE — 84144 ASSAY OF PROGESTERONE: CPT | Performed by: NURSE PRACTITIONER

## 2023-12-04 ENCOUNTER — MYC MEDICAL ADVICE (OUTPATIENT)
Dept: OBGYN | Facility: CLINIC | Age: 28
End: 2023-12-04
Payer: COMMERCIAL

## 2023-12-08 ENCOUNTER — LAB (OUTPATIENT)
Dept: LAB | Facility: CLINIC | Age: 28
End: 2023-12-08
Payer: COMMERCIAL

## 2023-12-08 DIAGNOSIS — N92.6 IRREGULAR MENSES: ICD-10-CM

## 2023-12-08 LAB — PROGEST SERPL-MCNC: 13.9 NG/ML

## 2023-12-08 PROCEDURE — 84144 ASSAY OF PROGESTERONE: CPT

## 2023-12-08 PROCEDURE — 36415 COLL VENOUS BLD VENIPUNCTURE: CPT

## 2023-12-28 ENCOUNTER — PRENATAL OFFICE VISIT (OUTPATIENT)
Dept: OBGYN | Facility: CLINIC | Age: 28
End: 2023-12-28
Payer: COMMERCIAL

## 2023-12-28 VITALS
SYSTOLIC BLOOD PRESSURE: 137 MMHG | DIASTOLIC BLOOD PRESSURE: 86 MMHG | OXYGEN SATURATION: 95 % | HEART RATE: 109 BPM | BODY MASS INDEX: 30.46 KG/M2 | HEIGHT: 68 IN | WEIGHT: 201 LBS

## 2023-12-28 DIAGNOSIS — N91.2 ABSENCE OF MENSTRUATION: Primary | ICD-10-CM

## 2023-12-28 PROCEDURE — 99213 OFFICE O/P EST LOW 20 MIN: CPT | Performed by: NURSE PRACTITIONER

## 2023-12-28 NOTE — PATIENT INSTRUCTIONS
If you have any questions regarding your visit, Please contact your care team.     [employer]: [phone]  To Schedule an Appointment 24/7  Call: [phone]     Any Macias- APRN CNP      Maria L Jamil-Surgery Scheduler  Olga-Surgery Scheduler         Monday 7:30am-2:00pm    Tuesday 7:30am-4:00pm    Wednesday 7:30am-2:00pm    Thursday 7:30am-11:00am    Friday 7:30am-2:00pm [address] [address]  Phone- [phone]   Fax- [phone]     Imaging Scheduling all locations  [phone]    [facility] Labor and Delivery  [address] [place] [address]   [place] [address] [phone]         Urgent Care locations:  [facility]   Monday-Friday  10 am - 8 pm  Saturday and Sunday   9 am - 5 pm     [phone]   If you need a medication refill, please contact your pharmacy. Please allow 3 business days for your refill to be completed.  As always, Thank you for trusting us with your healthcare needs!      see additional instructions from your care team below

## 2023-12-28 NOTE — PROGRESS NOTES
Assessment & Plan     Absence of menstruation  We discussed her and her significant other's questions. Discussed prenatal care-plan ultrasound in about 2 weeks to confirm dates and viability. Continue PNV. Discussed prenatal visit schedule, delivery physicians, delivery hospital, imaging. She will notify her Endocrine team of her pregnancy. Will need close monitoring of blood pressure during pregnancy-plan baseline labs at new prenatal visit and likely prescribe blood pressure cuff. Discussed fluid recommendations, management of heartburn. Reviewed red flag symptoms for which she needs to call/send message. Patient is given an opportunity to ask questions and have them answered.  - US OB <14 Weeks w Transvaginal Single; Future    23 minutes spent by me on the date of the encounter doing chart review, history and exam, documentation and further activities per the note     JENNIFER Ryan CNP [facility] LULY Pittman is a 28 year old, presenting for the following health issues:  Confirmation Of Pregnancy    HPI     Patient was seen in October for irregular menstrual cycles. Work up had been started with her Endocrinologist. Patient had additional work up completed and was referred to RE by her endocrinologist, but has spontaneously conceived. LMP was 11/4/23, but ovulated around 12/1/23. Seen 12/14/23 at [facility] urgent care to have blood HCG and came back at 26.  Presents today to discuss next steps going forward. Patient plans to continue prenatal care with [facility] and delivery at [facility].  History of hyperaldosteronism, s/p total adrenalectomy. Had also been having HTN and was on several medications for management. Has discontinued all antihypertensives.   Denies any current nausea, vomiting, cramping or vaginal bleeding. Some mild headaches intermittently, improved with hydration. Also noting some heartburn.     Review of Systems   Constitutional, HEENT, cardiovascular,  "pulmonary, gi and gu systems are negative, except as otherwise noted.      Objective    /86 (BP Location: Right arm, Patient Position: Sitting, Cuff Size: Adult Regular)   Pulse 109   Ht 1.727 m (5' 8\")   Wt 91.2 kg (201 lb)   LMP 11/04/2023 (Exact Date)   SpO2 95%   BMI 30.56 kg/m    Body mass index is 30.56 kg/m .  Physical Exam   GENERAL: healthy, alert and no distress  MS: no gross musculoskeletal defects noted, no edema  SKIN: no suspicious lesions or rashes  PSYCH: mentation appears normal, affect normal/bright  "

## 2024-01-12 ENCOUNTER — ANCILLARY PROCEDURE (OUTPATIENT)
Dept: ULTRASOUND IMAGING | Facility: CLINIC | Age: 29
End: 2024-01-12
Attending: NURSE PRACTITIONER
Payer: COMMERCIAL

## 2024-01-12 DIAGNOSIS — N91.2 ABSENCE OF MENSTRUATION: ICD-10-CM

## 2024-01-12 PROCEDURE — 76817 TRANSVAGINAL US OBSTETRIC: CPT | Mod: TC | Performed by: STUDENT IN AN ORGANIZED HEALTH CARE EDUCATION/TRAINING PROGRAM

## 2024-01-12 PROCEDURE — 76801 OB US < 14 WKS SINGLE FETUS: CPT | Mod: TC | Performed by: STUDENT IN AN ORGANIZED HEALTH CARE EDUCATION/TRAINING PROGRAM

## 2024-02-15 LAB
ABO/RH(D): NORMAL
ANTIBODY SCREEN: NEGATIVE
SPECIMEN EXPIRATION DATE: NORMAL

## 2024-02-15 NOTE — PATIENT INSTRUCTIONS
Weeks 10 to 14 of Your Pregnancy: Care Instructions  It's now possible to hear the fetus's heartbeat with a special ultrasound device. And the fetus's organs are developing.    Decide about tests to check for birth defects. Think about your age, your chance of passing on a family disease, your need to know about any problems, and what you might do after you have the test results.   It's okay to exercise. Try activities such as walking or swimming. Check with your doctor before starting a new program.     You may feel more tired than usual.  Taking naps during the day may help.     You may feel emotional.  It might help to talk to someone.     You may have headaches.  Try lying down and putting a cool cloth over your forehead.     You can use acetaminophen (Tylenol) for pain relief.  Don't take any anti-inflammatory medicines (such as Advil, Motrin, Aleve), unless your doctor says it's okay.     You may feel a fullness or aching in your lower belly.  This can feel like the kind of cramps you might get before a period. A back rub may help.     You may need to urinate more.  Your growing uterus and changing hormones can affect your bladder.     You may feel sick to your stomach (morning sickness).  Try avoiding food and smells that make you feel sick.     Your breasts may feel different.  They may feel tender or get bigger. Your nipples may get darker. Try a bra that gives you good support.     Avoid alcohol, tobacco, and drugs (including marijuana).  If you need help quitting, talk to your doctor.     Take a daily prenatal vitamin.  Choose one with folic acid.   Follow-up care is a key part of your treatment and safety. Be sure to make and go to all appointments, and call your doctor if you are having problems. It's also a good idea to know your test results and keep a list of the medicines you take.  Where can you learn more?  Go to https://www.healthwise.net/patiented  Enter E090 in the search box to learn more  "about \"Weeks 10 to 14 of Your Pregnancy: Care Instructions.\"  Current as of: July 11, 2023               Content Version: 13.8    9213-6445 [employer].   Care instructions adapted under license by your healthcare professional. If you have questions about a medical condition or this instruction, always ask your healthcare professional. [employer] disclaims any warranty or liability for your use of this information.        Nutrition During Pregnancy: Care Instructions  Overview     Healthy eating when you are pregnant is important for you and your baby. It can help you feel well and have a successful pregnancy and delivery. During pregnancy your nutrition needs increase. Even if you have excellent eating habits, your doctor may recommend a multivitamin to make sure you get enough iron and folic acid.  You may wonder how much weight you should gain. In general, if you were at a healthy weight before you became pregnant, then you should gain between 25 and 35 pounds. If you were overweight before pregnancy, then you'll likely be advised to gain 15 to 25 pounds. If you were underweight before pregnancy, then you'll probably be advised to gain 28 to 40 pounds. Your doctor will work with you to set a weight goal that is right for you. Gaining a healthy amount of weight helps you have a healthy baby.  Follow-up care is a key part of your treatment and safety. Be sure to make and go to all appointments, and call your doctor if you are having problems. It's also a good idea to know your test results and keep a list of the medicines you take.  How can you care for yourself at home?  Eat plenty of fruits and vegetables. Include a variety of orange, yellow, and leafy dark-green vegetables every day.  Choose whole-grain bread, cereal, and pasta. Good choices include whole wheat bread, whole wheat pasta, brown rice, and oatmeal.  Get 4 or more servings of milk and milk products each day. Good choices " "include nonfat or low-fat milk, yogurt, and cheese. If you cannot eat milk products, you can get calcium from calcium-fortified products such as orange juice, soy milk, and tofu. Other non-milk sources of calcium include leafy green vegetables, such as broccoli, kale, mustard greens, turnip greens, bok jay, and brussels sprouts.  If you eat meat, pick lower-fat types. Good choices include lean cuts of meat and chicken or turkey without the skin.  Do not eat shark, swordfish, virgilio mackerel, or tilefish. They have high levels of mercury, which is dangerous to your baby. You can eat up to 12 ounces a week of fish or shellfish that have low mercury levels. Good choices include shrimp, wild salmon, pollock, and catfish. Limit some other types of fish, such as white (albacore) tuna, to 4 oz (0.1 kg) a week.  Heat lunch meats (such as turkey, ham, or bologna) to 165 F before you eat them. This reduces your risk of getting sick from a kind of bacteria that can be found in lunch meats.  Do not eat unpasteurized soft cheeses, such as brie, feta, fresh mozzarella, and blue cheese. They have a bacteria that could harm your baby.  Limit caffeine. If you drink coffee or tea, have no more than 1 cup a day. Caffeine is also found in rashmi.  Do not drink any alcohol. No amount of alcohol has been found to be safe during pregnancy.  Do not diet or try to lose weight. For example, do not follow a low-carbohydrate diet. If you are overweight at the start of your pregnancy, your doctor will work with you to manage your weight gain.  Tell your doctor about all vitamins and supplements you take.  When should you call for help?  Watch closely for changes in your health, and be sure to contact your doctor if you have any problems.  Where can you learn more?  Go to https://www.healthwise.net/patiented  Enter Y785 in the search box to learn more about \"Nutrition During Pregnancy: Care Instructions.\"  Current as of: February 28, 2023          "      Content Version: 13.8    6802-8181 [employer].   Care instructions adapted under license by your healthcare professional. If you have questions about a medical condition or this instruction, always ask your healthcare professional. [employer] disclaims any warranty or liability for your use of this information.      Exercise During Pregnancy: Care Instructions  Overview     Exercise is good for you during a healthy pregnancy. It can relieve back pain, swelling, and other discomforts. It also prepares your muscles for childbirth. And exercise can improve your energy level and help you sleep better.  If your doctor advises it, get more exercise. For example, walking is a good choice. Bit by bit, increase the amount you walk every day. Try for at least 30 minutes on most days of the week. You could also try a prenatal exercise class. But if you do not already exercise, be sure to talk with your doctor before you start a new exercise program. Doctors do not recommend contact sports during pregnancy.  Follow-up care is a key part of your treatment and safety. Be sure to make and go to all appointments, and call your doctor if you are having problems. It's also a good idea to know your test results and keep a list of the medicines you take.  How can you care for yourself at home?  Talk with your doctor about the right kind of exercise for each stage of pregnancy.  Listen to your body to know if your exercise is at a safe level.  Do not become overheated while you exercise. High body temperature can be harmful. Avoid activities that can make your body too hot.  If you feel tired, take it easy. You might walk instead of run.  If you are used to strenuous exercise, ask your doctor how to know when it's time to slow down.  If you exercised before getting pregnant, you should be able to keep up your routine early in your pregnancy. Later in your pregnancy, you may want to switch to more gentle  "activities.  Drink plenty of fluids before, during, and after exercise.  Avoid contact sports, such as soccer and basketball. Also avoid risky activities. These include scuba diving, horseback riding, and exercising at a high altitude (above 6,000 feet). If you live in a place with a high altitude, talk to your doctor about how you can exercise safely.  Do not get overtired while you exercise. You should be able to talk while you work out.  After your fourth month of pregnancy, avoid exercises that require you to lie flat on your back on a hard surface. These include sit-ups and some yoga poses.  Get plenty of rest. You may be very tired while you are pregnant.  Where can you learn more?  Go to https://www.[employer].net/patiented  Enter S801 in the search box to learn more about \"Exercise During Pregnancy: Care Instructions.\"  Current as of: July 11, 2023               Content Version: 13.8    0905-5292 [employer].   Care instructions adapted under license by your healthcare professional. If you have questions about a medical condition or this instruction, always ask your healthcare professional. [employer] disclaims any warranty or liability for your use of this information.      Learning About Pregnancy and Obesity  How does your weight affect your pregnancy?     The basics of prenatal care are the same for everyone, regardless of size. You'll get what you need to have a healthy baby.  But your size can make a difference in a few things. You and your doctor will have to watch your pregnancy weight. Your weight may affect your labor and delivery.  You may have some extra doctor visits and tests. And you may have some tests earlier in your pregnancy. You'll need to pay close attention to things like blood pressure and the chance of getting gestational diabetes. (This is a type of diabetes that sometimes happens during pregnancy.) And close attention will be given to your developing " "baby.  Work with your doctor to get the care you need. Go to all your doctor visits, and follow your doctor's advice about what to do and what to avoid during pregnancy.  How much weight gain is healthy?  If you are very overweight (obese), experts recommend that you gain between 11 and 20 pounds. Your doctor will work with you to set a weight goal that's right for you. In some cases, your doctor may recommend that you not gain any weight.  How much extra food do you need to eat?  Although you may joke that you're \"eating for two\" during pregnancy, you don't need to eat twice as much food. How much you can eat depends on:  Your height.  How much you weigh when you get pregnant.  How active you are.  If you're carrying more than one fetus (multiple pregnancy).  In the first trimester, you'll probably need the same amount of calories as you did before you were pregnant. In general, in your second trimester, you need to eat about 340 extra calories a day. In your third trimester, you need to eat about 450 extra calories a day.  What can you do to have a healthy pregnancy?  The best things you can do for you and your baby are to eat healthy foods, get regular exercise, avoid alcohol and smoking, and go to your doctor visits.  Eat a variety of foods from all the food groups. Make sure to get enough calcium and folic acid.  You may want to work with a dietitian to help you plan healthy meals to get the right amount of calories for you.  If you didn't exercise much before you got pregnant, talk to your doctor about how you can slowly get more active. Your doctor may want to set up an exercise program with you.  Where can you learn more?  Go to https://www.healthRetailigence.net/patiented  Enter B644 in the search box to learn more about \"Learning About Pregnancy and Obesity.\"  Current as of: July 11, 2023               Content Version: 13.8    1148-0450 [employer].   Care instructions adapted under license by your " healthcare professional. If you have questions about a medical condition or this instruction, always ask your healthcare professional. [employer] disclaims any warranty or liability for your use of this information.      You have been provided the CDC Warning Signs in Pregnancy document.    Additional copies can be found here: www.[employer].com/499470.pdf                                                       If you have any questions regarding your visit, Please contact your care team.     [facility] Access Services: [phone]  To Schedule an Appointment 24/7  Call: [phone] HOURS TELEPHONE NUMBER     Any Colleen- APRN CNP      Maria L Burgess-DYAN Pereira-DYAN Jamil-Surgery Scheduler  Olga-Surgery Scheduler         Monday 7:30am-2:00pm    Tuesday 7:30am-4:00pm    Wednesday 7:30am-2:00pm    Thursday 7:30am-11:00am    Friday 7:30am-2:00pm [address] [address]  Phone- [phone]   Fax- [phone]     Imaging Scheduling all locations  [phone]    [facility] Labor and Delivery  [address] [place] [address]   [place] [address] [phone]         Urgent Care locations:  [facility]   Monday-Friday  10 am - 8 pm  Saturday and Sunday   9 am - 5 pm     [phone]   If you need a medication refill, please contact your pharmacy. Please allow 3 business days for your refill to be completed.  As always, Thank you for trusting us with your healthcare needs!      see additional instructions from your care team below

## 2024-02-16 ENCOUNTER — PRENATAL OFFICE VISIT (OUTPATIENT)
Dept: OBGYN | Facility: CLINIC | Age: 29
End: 2024-02-16
Payer: COMMERCIAL

## 2024-02-16 ENCOUNTER — MYC MEDICAL ADVICE (OUTPATIENT)
Dept: OBGYN | Facility: CLINIC | Age: 29
End: 2024-02-16

## 2024-02-16 VITALS
BODY MASS INDEX: 30.58 KG/M2 | WEIGHT: 201.8 LBS | HEIGHT: 68 IN | HEART RATE: 85 BPM | DIASTOLIC BLOOD PRESSURE: 84 MMHG | SYSTOLIC BLOOD PRESSURE: 123 MMHG | OXYGEN SATURATION: 96 %

## 2024-02-16 DIAGNOSIS — O99.011 ANEMIA DURING PREGNANCY IN FIRST TRIMESTER: ICD-10-CM

## 2024-02-16 DIAGNOSIS — O09.90 HIGH RISK PREGNANCY, ANTEPARTUM: Primary | ICD-10-CM

## 2024-02-16 LAB
ALBUMIN MFR UR ELPH: 18 MG/DL
ALBUMIN SERPL BCG-MCNC: 4.4 G/DL (ref 3.5–5.2)
ALBUMIN UR-MCNC: ABNORMAL MG/DL
ALP SERPL-CCNC: 51 U/L (ref 40–150)
ALT SERPL W P-5'-P-CCNC: 11 U/L (ref 0–50)
ANION GAP SERPL CALCULATED.3IONS-SCNC: 10 MMOL/L (ref 7–15)
APPEARANCE UR: CLEAR
AST SERPL W P-5'-P-CCNC: 14 U/L (ref 0–45)
BACTERIA #/AREA URNS HPF: ABNORMAL /HPF
BASOPHILS # BLD AUTO: 0 10E3/UL (ref 0–0.2)
BASOPHILS NFR BLD AUTO: 0 %
BILIRUB SERPL-MCNC: 0.3 MG/DL
BILIRUB UR QL STRIP: NEGATIVE
BUN SERPL-MCNC: 9.1 MG/DL (ref 6–20)
CALCIUM SERPL-MCNC: 9.3 MG/DL (ref 8.6–10)
CHLORIDE SERPL-SCNC: 102 MMOL/L (ref 98–107)
COLOR UR AUTO: YELLOW
CREAT SERPL-MCNC: 0.7 MG/DL (ref 0.51–0.95)
CREAT UR-MCNC: 324 MG/DL
DEPRECATED HCO3 PLAS-SCNC: 22 MMOL/L (ref 22–29)
EGFRCR SERPLBLD CKD-EPI 2021: >90 ML/MIN/1.73M2
EOSINOPHIL # BLD AUTO: 0.2 10E3/UL (ref 0–0.7)
EOSINOPHIL NFR BLD AUTO: 2 %
ERYTHROCYTE [DISTWIDTH] IN BLOOD BY AUTOMATED COUNT: 13.6 % (ref 10–15)
GLUCOSE SERPL-MCNC: 75 MG/DL (ref 70–99)
GLUCOSE UR STRIP-MCNC: NEGATIVE MG/DL
HBV SURFACE AG SERPL QL IA: NONREACTIVE
HCT VFR BLD AUTO: 32.8 % (ref 35–47)
HCV AB SERPL QL IA: NONREACTIVE
HGB BLD-MCNC: 10.9 G/DL (ref 11.7–15.7)
HGB UR QL STRIP: NEGATIVE
HIV 1+2 AB+HIV1 P24 AG SERPL QL IA: NONREACTIVE
IMM GRANULOCYTES # BLD: 0 10E3/UL
IMM GRANULOCYTES NFR BLD: 0 %
KETONES UR STRIP-MCNC: NEGATIVE MG/DL
LEUKOCYTE ESTERASE UR QL STRIP: NEGATIVE
LYMPHOCYTES # BLD AUTO: 1.9 10E3/UL (ref 0.8–5.3)
LYMPHOCYTES NFR BLD AUTO: 23 %
MCH RBC QN AUTO: 29.9 PG (ref 26.5–33)
MCHC RBC AUTO-ENTMCNC: 33.2 G/DL (ref 31.5–36.5)
MCV RBC AUTO: 90 FL (ref 78–100)
MONOCYTES # BLD AUTO: 0.6 10E3/UL (ref 0–1.3)
MONOCYTES NFR BLD AUTO: 8 %
MUCOUS THREADS #/AREA URNS LPF: PRESENT /LPF
NEUTROPHILS # BLD AUTO: 5.5 10E3/UL (ref 1.6–8.3)
NEUTROPHILS NFR BLD AUTO: 67 %
NITRATE UR QL: NEGATIVE
PH UR STRIP: 7.5 [PH] (ref 5–7)
PLATELET # BLD AUTO: 246 10E3/UL (ref 150–450)
POTASSIUM SERPL-SCNC: 4 MMOL/L (ref 3.4–5.3)
PROT SERPL-MCNC: 7.5 G/DL (ref 6.4–8.3)
PROT/CREAT 24H UR: 0.06 MG/MG CR (ref 0–0.2)
RBC # BLD AUTO: 3.65 10E6/UL (ref 3.8–5.2)
RBC #/AREA URNS AUTO: ABNORMAL /HPF
SODIUM SERPL-SCNC: 134 MMOL/L (ref 135–145)
SP GR UR STRIP: 1.02 (ref 1–1.03)
SQUAMOUS #/AREA URNS AUTO: ABNORMAL /LPF
UROBILINOGEN UR STRIP-ACNC: 0.2 E.U./DL
WBC # BLD AUTO: 8.2 10E3/UL (ref 4–11)
WBC #/AREA URNS AUTO: ABNORMAL /HPF

## 2024-02-16 PROCEDURE — 81001 URINALYSIS AUTO W/SCOPE: CPT | Performed by: NURSE PRACTITIONER

## 2024-02-16 PROCEDURE — 99213 OFFICE O/P EST LOW 20 MIN: CPT | Performed by: NURSE PRACTITIONER

## 2024-02-16 PROCEDURE — 36415 COLL VENOUS BLD VENIPUNCTURE: CPT | Performed by: NURSE PRACTITIONER

## 2024-02-16 PROCEDURE — 87340 HEPATITIS B SURFACE AG IA: CPT | Performed by: NURSE PRACTITIONER

## 2024-02-16 PROCEDURE — 87086 URINE CULTURE/COLONY COUNT: CPT | Performed by: NURSE PRACTITIONER

## 2024-02-16 PROCEDURE — 87491 CHLMYD TRACH DNA AMP PROBE: CPT | Performed by: NURSE PRACTITIONER

## 2024-02-16 PROCEDURE — 86900 BLOOD TYPING SEROLOGIC ABO: CPT | Performed by: NURSE PRACTITIONER

## 2024-02-16 PROCEDURE — 87591 N.GONORRHOEAE DNA AMP PROB: CPT | Performed by: NURSE PRACTITIONER

## 2024-02-16 PROCEDURE — 80053 COMPREHEN METABOLIC PANEL: CPT | Performed by: NURSE PRACTITIONER

## 2024-02-16 PROCEDURE — 86803 HEPATITIS C AB TEST: CPT | Performed by: NURSE PRACTITIONER

## 2024-02-16 PROCEDURE — 87389 HIV-1 AG W/HIV-1&-2 AB AG IA: CPT | Performed by: NURSE PRACTITIONER

## 2024-02-16 PROCEDURE — 86780 TREPONEMA PALLIDUM: CPT | Performed by: NURSE PRACTITIONER

## 2024-02-16 PROCEDURE — 84156 ASSAY OF PROTEIN URINE: CPT | Performed by: NURSE PRACTITIONER

## 2024-02-16 PROCEDURE — 86850 RBC ANTIBODY SCREEN: CPT | Performed by: NURSE PRACTITIONER

## 2024-02-16 PROCEDURE — 86762 RUBELLA ANTIBODY: CPT | Performed by: NURSE PRACTITIONER

## 2024-02-16 PROCEDURE — 84443 ASSAY THYROID STIM HORMONE: CPT | Performed by: NURSE PRACTITIONER

## 2024-02-16 PROCEDURE — 85025 COMPLETE CBC W/AUTO DIFF WBC: CPT | Performed by: NURSE PRACTITIONER

## 2024-02-16 PROCEDURE — 86901 BLOOD TYPING SEROLOGIC RH(D): CPT | Performed by: NURSE PRACTITIONER

## 2024-02-16 RX ORDER — NEBULIZER AND COMPRESSOR
EACH MISCELLANEOUS
Qty: 1 KIT | Refills: 0 | Status: SHIPPED | OUTPATIENT
Start: 2024-02-16

## 2024-02-16 NOTE — PROGRESS NOTES
Philipp is a 28 year old  @ nearly 13 weeks here for new OB visit.   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 Sees Endocrine and most recent visit was 10/2023. They did refer her to RE, but patient did spontaneously conceive.  Has had some elevated blood pressures in the past, has been on several medications prior to her surgery for management. Last several have been normal, but upper limits.       Noting some left hip discomfort the last several days especially, some discomfort with prolonged laying or sitting, position changes.   Hx: TB exposure and did have treatment through public health location in [facility] about 6 years ago.      See OB questionnaire for pertinent components of HPI.    OBhx: never pregnant  Gyne: Pap smears Normal  Past Medical History:   Diagnosis Date    Primary aldosteronism (H24)     Secondary hypertension      Past Surgical History:   Procedure Laterality Date    ADRENALECTOMY Left 2023    IR ADRENAL VENOGRAM BILATERAL  10/17/2022     Patient Active Problem List    Diagnosis Date Noted    Primary aldosteronism (H24) 2023     Priority: Medium    Hyperparathyroidism due to vitamin D deficiency (H24) 10/14/2021     Priority: Medium    Vitamin D deficiency 10/05/2021     Priority: Medium    Myopia of both eyes with astigmatism 2019     Priority: Medium     Formatting of this note might be different from the original.  Last Assessment & Plan:   Formatting of this note might be different from the original.  Refractive Error  - Healthy ocular structures with mild refractive error   - Due to the patient's absence of visual complaints, will defer dispensing glasses Rx. Her uncorrected visual acuity is 20/25-2 in right eye and 20/25-3 in left eye.   - Return in 1 year for CEE      Dry eyes 2019     Priority: Medium     Formatting of this note might be different from the original.  Last Assessment & Plan:    Formatting of this note might be different from the original.  - Dry eyes bilaterally with MGD  - No signs of inflammation or infection  - Start artificial tear drops QID and PRN   - Monitor; to return if symptoms do not improve as anticipated      Redness of both eyes 03/27/2019     Priority: Medium     Formatting of this note might be different from the original.  Last Assessment & Plan:   Formatting of this note might be different from the original.  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     Priority: Medium    Palpitation 12/31/2018     Priority: Medium      No Known Allergies  Current Outpatient Medications   Medication Sig Dispense Refill    Prenatal Vit-Fe Fumarate-FA (PRENATAL VITAMINS PO)          Review of Systems:     CONSTITUTIONAL:NEGATIVE for fever, chills, change in weight  INTEGUMENTARY/SKIN: NEGATIVE for worrisome rashes, moles or lesions  EYES: NEGATIVE for vision changes or irritation  ENT/MOUTH: NEGATIVE for ear, mouth and throat problems  RESP:NEGATIVE for significant cough or SOB  BREAST: NEGATIVE for masses, tenderness or discharge  CV: NEGATIVE for chest pain, palpitations or peripheral edema  GI: NEGATIVE for nausea, abdominal pain, heartburn, or change in bowel habits  :  Denies current vaginal bleeding, abnormal vaginal discharge  NEURO: NEGATIVE for weakness, dizziness or paresthesias  HEME/ALLERGY/IMMUNE: NEGATIVE for bleeding problems  PSYCHIATRIC: NEGATIVE for changes in mood or affect      Past Medical History of Father of Baby: No significant medical history  History Since Last Menstrual Period: No Problems    Physical Exam: /84 (BP Location: Right arm, Patient Position:  "Sitting, Cuff Size: Adult Large)   Pulse 85   Ht 1.727 m (5' 8\")   Wt 91.5 kg (201 lb 12.8 oz)   LMP 2023 (Exact Date)   SpO2 96%   BMI 30.68 kg/m    General: Well developed, well nourished female  Skin: Normal  Abdomen: Benign and No masses, organomegaly    Extremities: Normal  Neurological: Normal   Perineum: Intact   Vulva: Normal  Vagina: Normal mucosa, no discharge  Cervix: Nulliparous, closed    A/P 28 year old  at 13 weeks  Discussed physician coverage, tertiary support, diet, exercise, weight gain, schedule of visits, routine and indicated ultrasounds, and childbirth education.    Labs today:  CBC with platelets  ABORh Type and Screen  Rubella antibody  Treponema  Hepatitis C antibody  HIV antigen/antibody combo  Hepatitis B surface antigen  Urine Culture  Urine microscopic and macroscopic  Gonorrhea  Chlamydia    Will add in CMP and UPCr for baseline. Patient given prescription for blood pressure cuff given past need for medication and upper normal values recently-though normal today. Will hold off on medication at this time, but discussed need for close monitoring during pregnancy.   Patient will also notify Endocrinologist that she is currently pregnant as well.     Continue taking prenatal vitamins  Discussed genetic screening options in pregnancy and reviewed benefits and limitations. Patient would like first trimester screening and will enter referral for MFM, and also due to her history.     Discussed aspirin use in pregnancy.  Low-dose aspirin prophylaxis can be beneficial in women at high risk of developing preeclampsia.  I generally recommend we begin aspirin at about 12-13 weeks gestation and continue until at least 36 weeks.     Women with at least one of the following conditions are considered high risk for developing preeclampsia: Previous pregnancy with preeclampsia,  multifetal-gestation, chronic hypertension, diabetes mellitus, chronic kidney disease, autoimmune disease.   "   Women with more than 1 of the following conditions may also consider low-dose aspirin prophylaxis in pregnancy: Nulliparity, BMI greater than 30, family history of preeclampsia (mother or sister), AMA, socio-demographic characteristics, personal risk factors.       Patient does meet the above criteria.  Discussed risks and benefits of low dose Asprin therapy and she elects to proceed.      Discussed relief measures to try for her left hip discomfort, if it persists/worsens, will plan physical therapy referral.      Any RODRIGUEZ CNP

## 2024-02-17 LAB
C TRACH DNA SPEC QL NAA+PROBE: NEGATIVE
N GONORRHOEA DNA SPEC QL NAA+PROBE: NEGATIVE
T PALLIDUM AB SER QL: NONREACTIVE

## 2024-02-18 LAB — BACTERIA UR CULT: NO GROWTH

## 2024-02-19 ENCOUNTER — PRE VISIT (OUTPATIENT)
Dept: MATERNAL FETAL MEDICINE | Facility: CLINIC | Age: 29
End: 2024-02-19
Payer: COMMERCIAL

## 2024-02-19 DIAGNOSIS — I15.2 BENIGN SECONDARY HYPERTENSION DUE TO PRIMARY ALDOSTERONISM (H): ICD-10-CM

## 2024-02-19 DIAGNOSIS — E26.09 PRIMARY ALDOSTERONISM (H): Primary | ICD-10-CM

## 2024-02-19 DIAGNOSIS — E26.09 BENIGN SECONDARY HYPERTENSION DUE TO PRIMARY ALDOSTERONISM (H): ICD-10-CM

## 2024-02-19 PROBLEM — O99.011 ANEMIA DURING PREGNANCY IN FIRST TRIMESTER: Status: ACTIVE | Noted: 2024-02-19

## 2024-02-19 LAB
RUBV IGG SERPL QL IA: 25.5 INDEX
RUBV IGG SERPL QL IA: POSITIVE
TSH SERPL DL<=0.005 MIU/L-ACNC: 0.51 UIU/ML (ref 0.3–4.2)

## 2024-02-19 NOTE — TELEPHONE ENCOUNTER
, 13w2d.    Pt was last seen by JENNIFER Obrien CNP, on .  Pt was advised to follow up with her endocrinologist to let them know she is pregnant.    Pt wrote in and attached her endocrinologist's response/recommendations.    Routing to Any to review.    Kelli Cruz RN

## 2024-02-20 ENCOUNTER — HOSPITAL ENCOUNTER (OUTPATIENT)
Dept: ULTRASOUND IMAGING | Facility: CLINIC | Age: 29
Discharge: HOME | End: 2024-02-20
Attending: ADVANCED PRACTICE MIDWIFE
Payer: COMMERCIAL

## 2024-02-20 ENCOUNTER — LAB (OUTPATIENT)
Dept: LAB | Facility: CLINIC | Age: 29
End: 2024-02-20
Attending: ADVANCED PRACTICE MIDWIFE
Payer: COMMERCIAL

## 2024-02-20 ENCOUNTER — MEDICAL CORRESPONDENCE (OUTPATIENT)
Dept: HEALTH INFORMATION MANAGEMENT | Facility: CLINIC | Age: 29
End: 2024-02-20

## 2024-02-20 ENCOUNTER — OFFICE VISIT (OUTPATIENT)
Dept: MATERNAL FETAL MEDICINE | Facility: CLINIC | Age: 29
End: 2024-02-20
Attending: ADVANCED PRACTICE MIDWIFE
Payer: COMMERCIAL

## 2024-02-20 DIAGNOSIS — E26.09 BENIGN SECONDARY HYPERTENSION DUE TO PRIMARY ALDOSTERONISM (H): ICD-10-CM

## 2024-02-20 DIAGNOSIS — E26.09 PRIMARY ALDOSTERONISM (H): ICD-10-CM

## 2024-02-20 DIAGNOSIS — Z36.9 PRENATAL SCREENING ENCOUNTER: Primary | ICD-10-CM

## 2024-02-20 DIAGNOSIS — I15.2 BENIGN SECONDARY HYPERTENSION DUE TO PRIMARY ALDOSTERONISM (H): ICD-10-CM

## 2024-02-20 DIAGNOSIS — Z36.9 FIRST TRIMESTER SCREENING: Primary | ICD-10-CM

## 2024-02-20 PROCEDURE — 36415 COLL VENOUS BLD VENIPUNCTURE: CPT

## 2024-02-20 PROCEDURE — 96040 HC GENETIC COUNSELING, EACH 30 MINUTES: CPT

## 2024-02-20 PROCEDURE — 76813 OB US NUCHAL MEAS 1 GEST: CPT | Mod: 26 | Performed by: OBSTETRICS & GYNECOLOGY

## 2024-02-20 PROCEDURE — 76813 OB US NUCHAL MEAS 1 GEST: CPT

## 2024-02-20 NOTE — PROGRESS NOTES
"Please see \"imaging\" tab under \"Chart Review\" for details of today's US at the [facility].    Luis Sesay MD  Maternal-Fetal Medicine    "

## 2024-02-20 NOTE — NURSING NOTE
Patient presents to [facility] for GC/NT at 13w3d due to primary aldosteronism s/p adrenalectomy, secondary HTN. Denies LOF, vaginal bleeding, cramping/contractions. SBAR given to COLE MD, see their note in Epic.

## 2024-02-20 NOTE — PROGRESS NOTES
"[facility]  Genetic Counseling Consult    Patient:  Philipp \"Zain\" Slade YOB: 1995   Date of Service:  24   MRN: [id number]    Zain was seen at the [facility] [place] [facility] for genetic consultation. The indication for genetic counseling is desire to discuss options for genetic screening and diagnostics. The patient was unaccompanied to this visit.     The session was conducted in English.        IMPRESSION/ PLAN   1. Zain has not had genetic screening in this pregnancy but elected to have screening today.     2. During today's [facility] visit, Zain had a blood draw for non-invasive prenatal testing (also called NIPT, NIPS, or cell-free DNA) through [facility] [employer] This NIPT screens for trisomy 21, 18, and 13 and the patient opted to screen for sex chromosome aneuploidies, including reported fetal sex. Results are expected in 7-10 days. The patient will be called with results and if they do not answer they requested a detailed message with results on their voicemail, including the predicted fetal sex information.  Patient was informed that results, including fetal sex, will be available in [employer].    3. Since the patient chose aneuploidy screening via NIPT, quad screen is NOT recommended in the second trimester. If the patient desires screening for open neural tube defects, maternal serum AFP only is recommended, ideally between 16-18 weeks gestation.    4. Zain had a nuchal translucency ultrasound today. Please see the ultrasound report for further details.    5.  Further recommendations include an [facility] consultation which was been scheduled for 2024.    PREGNANCY HISTORY   /Parity:     No bleeding, illnesses, or exposures of concern were  reported at today's visit. Zain's pregnancy history is non-contributory.    CURRENT PREGNANCY   Current Age: 28 year old   Age at Delivery: " "28 year old  KOURTNEY: 2024, by Ultrasound                                   Gestational Age: 13w3d  This pregnancy is a single gestation.   This pregnancy was conceived spontaneously.    MEDICAL HISTORY   Zain has a personal history of primary aldosteronism which has caused a history of hypertension. She is status post a left adrenalectomy in May 2023. She follows with endocrinology. Zain also has a personal history of anemia, hyperparathyroidism secondary to vitamin D deficiency, and heart palpitations for which she had a work up with cardiology. The patient is scheduled to have an [facility] consultation on 2024 to discuss her personal medical history in more detail as well as pregnancy management recommendations.       FAMILY HISTORY   A three-generation family history was obtained today and is scanned under the \"Media\" tab in Epic. The family history was reported by Philipp.    The following significant findings were reported today:   Zain has a maternal uncle who has sickle cell anemia.  We discussed that, given this family history, Zain's mother has a 2/3 chance of being a carrier for sickle cell anemia. Therefore, Zain has a 1/3 chance of being a carrier for sickle cell anemia. Zain shared that her  does not have  ancestry, which reduces the chances of him being a carrier. We discussed that even if Zain decides not to pursue carrier screening in the future, sickle cell anemia is screened for on the [place]  Screen.  Zain's 's mother has diabetes, Zain is unsure if she has type I or type II.  Diabetes is a complex genetic trait (a multifactorial condition) caused by the combination of genetic and environmental factors. Having a family history of diabetes can increase one's risk of also developing diabetes. If a parent develops diabetes before age 11, their child's risk is typically doubled. Furthermore, if both parents have type I " diabetes, the risk is between 1 in 10 to 1 in 4. These risk numbers are an estimate and can be complicated by many other factors such as autoimmune disorders and lifestyle choices. Therefore, there is currently no prenatal genetic testing we would offer the patient at this time to assess for diabetes risks in the current pregnancy. We also reviewed that individuals with a family history of type II diabetes are generally thought to be at increased risk to develop type II diabetes. Therefore, it is important for individuals with a family history of type II diabetes to let their physicians to know about this family history, so they can be appropriate screened for diabetes.    Otherwise, the reported family history is unremarkable for multiple miscarriages, stillbirths, birth defects, intellectual disabilities, autism spectrum disorder, developmental delays, cancer diagnosed under 50, known genetic conditions, and consanguinity.       RISK ASSESSMENT FOR INHERITED CONDITIONS AND CARRIER SCREENING OPTIONS   Expanded carrier screening is available to screen for autosomal recessive conditions and X-linked conditions in a large list of genes. Carrier screening does not test the pregnancy but gives a risk assessment for the pregnancy and future pregnancies to have the condition. Expanded carrier screening is designed to identify carrier status for conditions that are primarily childhood or adolescent onset. Expanded carrier screening does not evaluate for adult-onset conditions such as hereditary cancer syndromes, dementia/Alzheimer's disease, or cardiovascular disease risk factors. Additionally, expanded carrier screening is not comprehensive for all known genetic diseases or inherited conditions. Carrier screening does not test for all genetic and health conditions or risk factors.     Autosomal recessive conditions happen when a mutation has been inherited from the egg and sperm and include conditions like cystic  fibrosis, thalassemia, hearing loss, spinal muscular atrophy, and more. We reviewed that when both biological parents carry a harmful genetic change in a gene associated with autosomal recessive inheritance, each of their pregnancies has a 1 in 4 (25%) chance to be affected by that condition. X-linked conditions happen when a mutation has been inherited from the egg and include conditions like fragile X syndrome.With X-linked conditions, the specific risk generally depends on the chromosomal sex of the fetus, with XY individuals (generally male) being most severely affected.     [place] screening was reviewed. About [place]  Screening    The patient does have a family history of a known inherited condition, her maternal uncle has sickle cell anemia. This puts her chance of being a carrier for sickle cell anemia at 1/3 (33%). Sickle cell anemia is screened for on the [place]  Screen. The patient has not had carrier screening previously. Zain was not certain about whether to pursue carrier screening today. I provided her with an educational handout about carrier screening to share with her  as well as CPT codes to check insurance coverage. The couple will contact us if they would like to pursue screening.     Please see below for more details about what we discussed in regards to carrier screening:  Carrier screening does not test the pregnancy but gives a risk assessment for the pregnancy and future pregnancies to have the condition  If both partners are carriers of the same condition, there is a 1 in 4 (25%) chance for any pregnancies they have together to have the condition   There are different size panels or list of conditions for carrier screening. We discussed options of a ~420 gene panel, a ~175 gene panel, and a smaller ~15 gene panel.  Carrier screening does not test for all genetic and health conditions or risk factors  The lab will communicate the out-of-pocket cost with the patient  and will also provide an option to switch to self-pay. The default is billing through insurance, and it is the patient's responsibility to respond to the communication if they would like to switch to self-pay.    RISK ASSESSMENT FOR CHROMOSOME CONDITIONS   We explained that the risk for fetal chromosome abnormalities increases with maternal age. We discussed specific features of common chromosome abnormalities, including Down syndrome, trisomy 13, trisomy 18, and sex chromosome trisomies.    At age 28 at midtrimester, the risk to have a baby with Down syndrome is 1 in 855.  At age 28 at midtrimester, the risk to have a baby with any chromosome abnormality is 1 in 428.     We also discussed that current ACMG guidelines recommend that screening for 22q11.2 deletion syndrome be offered to all pregnant patients. 22q11.2 deletion syndrome has an estimated prevalence of 1 in 990 to 1 in 2148 (0.05-0.1%). Risk is not thought to increase with maternal age. Clinical features are variable but include congenital heart defects, cleft palate, developmental delays, immune system deficiencies, and hearing loss. Approximately 90% of cases are de rina (a sporadic new change in a pregnancy).     Cell-free DNA screening for 22q11.2 deletion syndrome is available with the inclusion of other microdeletion syndromes (expanded NIPT through [facility]). At this time, it is not possible to only screen for 22q11.2 deletion syndrome without the additional microdeletion syndromes (1p36, 4p, 5p, 15q11). We discussed the limitations of cell-free DNA screening in detecting microdeletions, there is less data about the performance of cell-free DNA screening for more rare microdeletions and the chance for false positives or negatives may be increased. We also discussed that microdeletion syndromes outside of 22q11.2 are not part of universal prenatal screening guidelines.      Zain has not had genetic screening in this pregnancy but elected to have  screening today.      GENETIC TESTING OPTIONS FOR CHROMOSOMAL CONDITIONS   Genetic testing during a pregnancy includes screening and diagnostic procedures.      Screening tests are non-invasive which means no risk to the pregnancy and includes ultrasounds and blood work. The benefits and limitations of screening were reviewed. Screening tests provide a risk assessment (chance) specific to the pregnancy for certain fetal chromosome abnormalities but cannot definitively diagnose or exclude a fetal chromosome abnormality. Follow-up genetic counseling and consideration of diagnostic testing is recommended with any abnormal screening result. Diagnostic testing during a pregnancy is more certain and can test for more conditions. However, the tests do have a risk of miscarriage that requires careful consideration. These tests can detect fetal chromosome abnormalities with greater than 99% certainty. Results can be compromised by maternal cell contamination or mosaicism and are limited by the resolution of current genetic testing technology.     There is no screening or diagnostic test that detects all forms of birth defects or intellectual disability.     We discussed the following screening options:   Non-invasive prenatal testing (NIPT)  Also called cell-free DNA screening because it detects chromosomes from the placenta in the pregnant person's blood  Can be done any time after 10 weeks gestation  Screens for trisomy 21, trisomy 18, trisomy 13, and sex chromosome aneuploidies  Expanded NIPT also allows for reflex to include other microdeletion conditions and rare autosomal trisomies if an indication would arise later in the pregnancy. Zain declined screening for 22q11.2 and other microdeletion syndromes.  Cannot screen for open neural tube defects, maternal serum AFP after 15 weeks is recommended    We discussed the following ultrasound options:  Nuchal translucency (NT) ultrasound  Ultrasound between 11p1z-53y4g  that includes nuchal translucency measurement and nasal bone assessments  Nuchal translucency refers to the space at the back of the neck where fluid builds up. All babies at this stage have fluid and there is only concern if there is too much fluid  Nasal bone refers to the small bone in the nose. There is concern for conditions like Down syndrome if the bone cannot be seen at all  This ultrasound can be done as part of first trimester screening, at the same time as another screen (NIPT), at the same time as a CVS, or if the patients does not want genetic screening.  Markers on ultrasound detects about 70% of pregnancies with aneuploidy  Abnormalities on NT ultrasound can also increase the risk for a birth defect, like a heart defect    We discussed the following diagnostic options:   Amniocentesis  Invasive diagnostic procedure done after 15 weeks gestation  The procedure collects a small sample of amniotic fluid for the purpose of chromosomal testing and/or other genetic testing  Diagnostic result; more than 99% sensitivity for fetal chromosome abnormalities  Testing for AFP in the amniotic fluid can test for open neural tube defects           It was a pleasure to be involved with [facility]. Face-to-face time of the meeting was  40  minutes.    Diana Hammond MS, [facility]  Maternal Fetal Medicine  Office: [phone]  [facility]: [phone]   Fax: [phone]  [facility]

## 2024-02-29 ENCOUNTER — TELEPHONE (OUTPATIENT)
Dept: MATERNAL FETAL MEDICINE | Facility: CLINIC | Age: 29
End: 2024-02-29
Payer: COMMERCIAL

## 2024-02-29 LAB — SCANNED LAB RESULT: NORMAL

## 2024-02-29 NOTE — CONFIDENTIAL NOTE
February 29, 2024    I spoke with Zain regarding her non-invasive prenatal test (NIPT) results. I also emailed her a copy of this report to [email or web address], per her request.    Results indicate NO ANEUPLOIDY DETECTED for chromosomes 21, 18, 13, or sex chromosomes (XY - MALE).     This puts her current pregnancy at low risk for Down syndrome, trisomy 18, trisomy 13 and sex chromosome abnormalities. This test is reported to have the following sensitivities: Down syndrome: 99.7%, trisomy 18: 97.9%, and trisomy 13: 99.0%. Although these results are reassuring, this does not replace a standard chromosome analysis from a chorionic villus sampling or amniocentesis.     MSAFP is the appropriate second trimester screening test for open neural tube defects; the maternal quad screen is not recommended.    Zain is returning to [facility] on 03/07/2024 for a consultation.    Her results will be made available in her Epic chart for her primary OB to review.       Diana Hammond MS, [facility]  Maternal Fetal Medicine  Office: [phone]  [facility]: [phone]   Fax: [phone]  [facility]

## 2024-03-05 NOTE — PROGRESS NOTES
[facility]  [address], [place] [address]  Main: [phone]       Referring Provider: Any RODRIGUEZ CNP    HPI     Oyelydia DIAZ Slade is a 28 year old  at 15w5d by 7w6d US here for MFM consultation regarding a history of chronic hypertension secondary to primary hyperaldosteronism s/p unilateral adrenalectomy.     In 2021 Ms. June was found to have hypertension and hypokalemia. She was started on potassium supplementation and told to try lifestyle modification. When her hypertension persisted she was then started on Losartan, hydrochlorothiazide, and metoprolol. She had persistent hypokalemia and was eventually evaluated further for secondary causes of hypertension ultimately leading to her primary hyperaldosteronism diagnosis. 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 Aldosterone levels have since normalized  and electrolyte monitoring has been unremarkable in the post-operative period as well. She continues to follow with her endocrinologist, Dr. Sanchez.     She also has had a resolution in her blood pressures post-op and she is no longer on antihypertensive medications. She has a blood pressure cuff at home but has not routinely been monitoring her blood pressures. In  she had a TTE which showed LVH. She has followed with cardiology (Dr. Fuchs) in the past for assistance with managing her anti-hypertensives though she has not had cardiology follow up since . She has not had a repeat echocardiogram.     Today she endorses a headache and fatigue. These are new symptoms and not typical during her pregnancy. Otherwise she has been feeling well. She does not regularly check her BP at home but reports readings have been normal when she goes to the doctor.     Her current medications include Iron, PNV, ASA 81 mg, vitamin C.     Prenatal Care:  Primary OB care this  pregnancy has been with Any Macias CNP from [facility].     OB History    Para Term  AB Living   1 0 0 0 0 0   SAB IAB Ectopic Multiple Live Births   0 0 0 0 0      # Outcome Date GA Lbr Florentin/2nd Weight Sex Delivery Anes PTL Lv   1 Current                Past Medical History     Past Medical History:   Diagnosis Date    Primary aldosteronism (H24)     Secondary hypertension        Past Surgical History     Past Surgical History:   Procedure Laterality Date    ADRENALECTOMY Left 2023    IR ADRENAL VENOGRAM BILATERAL  10/17/2022       Medication List     Prior to Admission medications    Medication Sig Last Dose Taking? Auth Provider Long Term End Date   Blood Pressure Monitoring (ADULT BLOOD PRESSURE CUFF LG) KIT To measure blood pressure once daily at home   Any Macias APRN CNP     Prenatal Vit-Fe Fumarate-FA (PRENATAL VITAMINS PO)    Reported, Patient         Allergies   Patient has no known allergies.    Social History   Denies use of alcohol, drugs or smoking.    Family History     No family history on file.   Specifically denies family h/o hypertensive disorders     Review of Systems   10-point ROS negative except as in HPI.    Physical Exam   /68 (BP Location: Left arm, Patient Position: Sitting, Cuff Size: Adult Large)   Pulse 110   Resp 16   LMP 2023 (Exact Date)   SpO2 99%     Gen: NAD  CV: Borderline tachycardic, regular rhythm  Resp: CTAB  Abd: Gravid, non-tender  Ext: No edema    Labs      Latest Reference Range & Units 24 11:48   Total Protein Urine mg/mg Creat 0.00 - 0.20 mg/mg Cr 0.06       Latest Reference Range & Units 24 11:48   WBC 4.0 - 11.0 10e3/uL 8.2   Hemoglobin 11.7 - 15.7 g/dL 10.9 (L)   Hematocrit 35.0 - 47.0 % 32.8 (L)   Platelet Count 150 - 450 10e3/uL 246   RBC Count 3.80 - 5.20 10e6/uL 3.65 (L)   MCV 78 - 100 fL 90   MCH 26.5 - 33.0 pg 29.9   MCHC 31.5 - 36.5 g/dL 33.2   RDW 10.0 - 15.0 % 13.6   (L):  Data is abnormally low   Latest Reference Range & Units 24 11:48   TSH 0.30 - 4.20 uIU/mL 0.51      Latest Reference Range & Units 24 11:48   Sodium 135 - 145 mmol/L 134 (L)   Potassium 3.4 - 5.3 mmol/L 4.0   Chloride 98 - 107 mmol/L 102   Carbon Dioxide (CO2) 22 - 29 mmol/L 22   Urea Nitrogen 6.0 - 20.0 mg/dL 9.1   Creatinine 0.51 - 0.95 mg/dL 0.70   GFR Estimate >60 mL/min/1.73m2 >90   Calcium 8.6 - 10.0 mg/dL 9.3   Anion Gap 7 - 15 mmol/L 10   Albumin 3.5 - 5.2 g/dL 4.4   Protein Total 6.4 - 8.3 g/dL 7.5   Alkaline Phosphatase 40 - 150 U/L 51   ALT 0 - 50 U/L 11   AST 0 - 45 U/L 14   (L): Data is abnormally low   Latest Reference Range & Units 10/13/23 08:26   Aldosterone 0.0 - 31.0 ng/dL 12.5      Latest Reference Range & Units 10/13/23 08:26   Renin Activity ng/mL/hr 0.3     Other Pertinent Evaluation     Echo 3/10/22  Global and regional left ventricular function is normal with an EF of 55-60%.  Mild concentric wall thickening consistent with left ventricular hypertrophy  is present.  Global right ventricular function is normal.  No significant valvular abnormalities present.  IVC diameter <2.1 cm collapsing >50% with sniff suggests a normal RA pressure  of 3 mmHg.  No pericardial effusion is present.       Assessment/Counseling     Philipp June is a 28 year old  at 15w5d by 7w6d US here for [facility] consultation regarding h/o adrenalectomy and secondary cHTN.    History of primary hyperaldosteronism s/p adrenalectomy  Resolved secondary HTN  Zain has a h/o secondary hypertension which has resolved s/p adrenalectomy.   We reviewed the cardiovascular changes associated with pregnancy including a decrease in blood pressure during the midtrimester with increase back to baseline in the third trimester. In women with a history of chornic hypertension, we generally recommend home BP monitoring and the initiation of antihypertensive therapy (if blood pressure is ?140/90mmHg. In the event of  new elevations in blood pressures after 20 weeks gestation, we would recommend thorough evaluation for preeclampsia prior to medication dose escalation. Regarding the relative safety of various antihypertensive classes of medications during pregnancy labetalol or long-acting nifedipine safe options for blood pressure control in pregnancy. We generally avoid ACE inhibitors or angiotensin receptor blockers during pregnancy due to their association with fetal malformations if taken during the first trimester as well as renal dysfunction and oligohydramnios during the second trimester.     We reviewed the effects of chronic hypertension on pregnancy including the risk of worsening blood pressures requiring titration of antihypertensive therapy, increased risk of preeclampsia, fetal growth restriction, and need for  delivery with associated complications of prematurity. Rates of abruption and stillbirth are also higher in women with chronic hypertension. These risks are likely slightly lower in Zain given improvement in her BP since her adrenalectomy. Given the increased risk for preeclampsia, baseline laboratory assessment (hemoglobin, platelets, liver function, and creatinine) as well as urine protein excretion by protein to creatinine ratio is recommended; these labs have previously been completed for Zain and were within normal range. In patients with long standing hypertension cardiac evaluation with an EKG or Echocardiogram is also advised. Given her h/o LVH by her last echocardiogram in 3/22, we would also recommend a repeat echo in pregnancy to evaluate her cardiac function. This was discussed with her today.     We recommend a comprehensive anatomic survey with our office and assessment of fetal growth at 32 weeks.We also reviewed the reason for use of low-dose aspirin (81 mg per day), which Zain is already taking, which may reduce the risk of preeclampsia by up to 25%.     Finally, we  reviewed that a small subset of patients with primary hyperaldosteronism, disease continues even after unilateral adrenalectomy. Even in these patients, it is most common to see improvement in BP after surgery and decrease in need for anti-hypertensive therapy. Our pt appears to be in biochemical cure with improvement of blood pressures and normalization of her aldosterone and potassium in the post-operative period. We reviewed that this all bodes well for her in pregnancy in terms of reduced risk (compared to when she had elevated aldosterone levels and hypertension) but she may still have increased risks for pregnancy-related cardiovascular disease including preeclampsia more so than the general population. Do to this we would recommend close BP monitoring, intermittent laboratory monitoring during pregnancy and continuation of aspirin is recommended.     Recommendations     - Please obtain an EKG and repeat echocardiogram (echocardiogram ordered today)  - Continue ASA 81 mg daily for preeclampsia prevention   - Recommend monitoring labs (BMP) every trimester to ensure normal potassium and renal function   -  Recommend home BP monitoring with goal BP <140/90 mmHg. In the event of  elevated home BP readings would recommend Benedicte reach out to your office  - Initiation of antihypertensive agents if BP ? 140/90 mmHg (labetalol or Nifedipine); if after 20 weeks thorough evaluation to rule out superimposed preeclampsia is warranted before dose initiation/titration   - Detailed anatomy US at 18-20 weeks with our office  - Recommend growth US at 32 weeks  - No indication for  testing at this time    Pt tachycardic on arrival (to 110s) and reported a headache today. Repeat vitals prior to discharge showed HR in the 90s which was reassuring. Recommended oral hydration and ongoing monitoring of heart rate and other new sx. Discussed she should be further evaluated with ECG, lab tests if this becomes a persistent  problem or is worsening.    The patient was seen and evaluated with Dr. Stephie Call.    At the end of our discussion, Ms. June indicated that her questions were answered and she seemed satisfied with our discussion.  Thank you for allowing us to participate in the care of your patient. Please do not hesitate to contact us if you have further questions regarding the management of your patient.       Sincerely,  Keri Jay MD, PGY2       MFM Attending Attestation  I have seen and evaluated the patient with Dr. Jay. I reviewed her chart and agree with the above documented assessment and plan. I spent a total of 60 minutes on the date of this encounter including preparing to see the patient (reviewing medical records/tests), counseling and discussing the plan of care, documenting the visit in the electronic medical record, and communicating with other health care professionals and/or care coordination.Please see her note for specific details; I have made the necessary edits/additions.  Stephie Call MD  Maternal Fetal Medicine Physician

## 2024-03-07 ENCOUNTER — OFFICE VISIT (OUTPATIENT)
Dept: MATERNAL FETAL MEDICINE | Facility: CLINIC | Age: 29
End: 2024-03-07
Attending: OBSTETRICS & GYNECOLOGY
Payer: COMMERCIAL

## 2024-03-07 VITALS
SYSTOLIC BLOOD PRESSURE: 122 MMHG | RESPIRATION RATE: 16 BRPM | HEART RATE: 100 BPM | OXYGEN SATURATION: 99 % | DIASTOLIC BLOOD PRESSURE: 80 MMHG

## 2024-03-07 DIAGNOSIS — E26.09 PRIMARY ALDOSTERONISM (H): ICD-10-CM

## 2024-03-07 DIAGNOSIS — Z86.79 HISTORY OF HYPERTENSION: ICD-10-CM

## 2024-03-07 DIAGNOSIS — E26.09 BENIGN SECONDARY HYPERTENSION DUE TO PRIMARY ALDOSTERONISM (H): ICD-10-CM

## 2024-03-07 DIAGNOSIS — O09.90 HIGH-RISK PREGNANCY, UNSPECIFIED TRIMESTER: Primary | ICD-10-CM

## 2024-03-07 DIAGNOSIS — I15.2 BENIGN SECONDARY HYPERTENSION DUE TO PRIMARY ALDOSTERONISM (H): ICD-10-CM

## 2024-03-07 PROCEDURE — 99417 PROLNG OP E/M EACH 15 MIN: CPT | Mod: GC | Performed by: OBSTETRICS & GYNECOLOGY

## 2024-03-07 PROCEDURE — 99213 OFFICE O/P EST LOW 20 MIN: CPT | Performed by: OBSTETRICS & GYNECOLOGY

## 2024-03-07 PROCEDURE — 99215 OFFICE O/P EST HI 40 MIN: CPT | Mod: GC | Performed by: OBSTETRICS & GYNECOLOGY

## 2024-03-07 RX ORDER — MULTIVIT-MIN/IRON/FOLIC ACID/K 18-600-40
CAPSULE ORAL
COMMUNITY

## 2024-03-07 RX ORDER — FERROUS SULFATE 325(65) MG
325 TABLET ORAL
COMMUNITY

## 2024-03-07 RX ORDER — ASPIRIN 81 MG/1
81 TABLET ORAL DAILY
COMMUNITY

## 2024-03-07 NOTE — Clinical Note
Thank you for allowing us to participate in the care of your patient Philipp June. Please see attached for details from our visit and don't hesitate to reach out with questions.   MD BENITEZ Clay , OB/GYN Maternal-Fetal Medicine [phone] (Pager)

## 2024-03-07 NOTE — NURSING NOTE
Zain here for [facility] consult at 15w5d related to history of aldosteronism s/p adrenalectomy secondary HTN prior to surgery. Medications and allergies reviewed. Dr. Jay and Dr. Call met with patient to discuss plan. Plan for return for L2. Patient discharged stable and ambulatory.

## 2024-03-09 ENCOUNTER — HEALTH MAINTENANCE LETTER (OUTPATIENT)
Age: 29
End: 2024-03-09

## 2024-03-14 NOTE — PROGRESS NOTES
Patient presents for routine prenatal visit. Prenatal flowsheet reviewed and updated as needed.  Denies vaginal bleeding, loss of fluid, contractions or cramping. Denies headache, nausea/vomiting, upper abdominal pain, vision changes, lower extremity swelling, chest pain or shortness of breath.     Anticipatory guidance appropriate for gestational age reviewed.  PE: See OB vitals    Pregnancy complicated by:  S/P Adrenalectomy (with resolution of her HTN)-follows with Endocrine at [place]. ECHO scheduled along with Level 2 ultrasound. Needs EKG and discussed scheduling with primary care. Discussed rationale.  Has not been monitoring blood pressure at home, encouraged to start.   Plan BMP on return to clinic-last was just a month ago.    Routine prenatal care:  CfDNA neg, discussed MSAFP to screen for open neural tube defects and patient accepts.    Questions asked and answered. Next OB visit in 4 week(s) with OB Physician.    Any RODRIGUEZ CNP

## 2024-03-14 NOTE — PATIENT INSTRUCTIONS
"Weeks 14 to 18 of Your Pregnancy: Care Instructions  Around this time, you may start to look pregnant. Your baby is now able to pass urine. And the first stool (meconium) is starting to collect in your baby's intestines. Hair is starting to grow on your baby's head.    You may notice some skin changes, such as itchy spots on your palms or acne on your face.    At your next doctor visit, you may have an ultrasound. So you might think about whether you want to know the sex of your baby. Also ask your doctor about flu and COVID-19 shots.     How to reduce stress   Ask for help when you need it.  Try to avoid things that cause you stress.  Seek out things that relieve stress, such as breathing exercises or yoga.     How to get exercise   If you don't usually exercise, start slowly. Short walks may be a good choice.  Try to be active 30 minutes a day, at least 5 days a week.  Avoid activities where you're more likely to fall.  Use light weights to reduce stress on your joints.     How to stay at a healthy weight for you   Talk to your doctor or midwife about how much weight you should gain.  It's generally best to gain:  About 28 to 40 pounds if you're underweight.  About 25 to 35 pounds if you're at a healthy weight.  About 15 to 25 pounds if you're overweight.  About 11 to 20 pounds if you're very overweight (obese).  Follow-up care is a key part of your treatment and safety. Be sure to make and go to all appointments, and call your doctor if you are having problems. It's also a good idea to know your test results and keep a list of the medicines you take.  Where can you learn more?  Go to https://www.[employer].net/patiented  Enter I453 in the search box to learn more about \"Weeks 14 to 18 of Your Pregnancy: Care Instructions.\"  Current as of: July 10, 2023               Content Version: 14.0    1133-4514 [employer].   Care instructions adapted under license by your healthcare professional. If you have " questions about a medical condition or this instruction, always ask your healthcare professional. Healthwise, [facility] disclaims any warranty or liability for your use of this information.      Second-Trimester Fetal Ultrasound: About This Test  What is it?     Fetal ultrasound is a test that uses sound waves to make pictures of your baby (fetus) and placenta inside the uterus. The test is the safest way to find out the age, size, and position of your baby. You also may be able to find out the sex of your baby. (But the test isn't done just to find out a baby's sex.)  No known risks to the mother or the baby are linked to fetal ultrasound. But you may feel anxious if the test reveals a problem with your pregnancy or baby.  Why is this test done?  In the second trimester, a fetal ultrasound is done to:  Estimate the number of weeks and days a fetus has developed since the beginning of the pregnancy. This is called the gestational age.  Look at the size and position of the fetus, the placenta, and the fluid that surrounds the fetus.  Find major birth defects, such as heart problems or problems with the brain and spinal cord (neural tube defects). But the test may not be able to find many minor defects and some major birth defects.  How do you prepare for the test?  In general, there's nothing you have to do before this test, unless your doctor tells you to.  How is the test done?  You may be able to leave your clothes on, or you will be given a gown to wear.  You will lie on your back on a padded examination table.  A gel will be spread on your belly. It will be removed after the test.  A small, handheld device called a transducer will be pressed against the gel on your skin and moved across your belly several times.  You may watch the monitor to see the picture of your baby during the test.  What happens after the test?  You will probably be able to go home right away.  You most likely will be able to go back to  "your usual activities right away.  Follow-up care is a key part of your treatment and safety. Be sure to make and go to all appointments, and call your doctor if you are having problems. It's also a good idea to keep a list of the medicines you take. Ask your doctor when you can expect to have your test results.  Where can you learn more?  Go to https://www.SaveOnEnergy.com.DIY Genius/patiented  Enter Y671 in the search box to learn more about \"Second-Trimester Fetal Ultrasound: About This Test.\"  Current as of: July 10, 2023               Content Version: 14.0    3493-2724 [employer].   Care instructions adapted under license by your healthcare professional. If you have questions about a medical condition or this instruction, always ask your healthcare professional. [employer] disclaims any warranty or liability for your use of this information.      During Pregnancy: Exercises  Introduction  Here are some examples of exercises to do during your pregnancy. Start each exercise slowly. Ease off the exercise if you start to have pain.  Talk to your doctor about when you can start these exercises and which ones will work best for you.  How to do the exercises  Neck rotation    Sit up straight in a firm chair, or stand up straight. If you're standing, keep your feet about hip-width apart.  Keeping your chin level, turn your head to the right and hold for 15 to 30 seconds.  Turn your head to the left and hold for 15 to 30 seconds.  Repeat 2 to 4 times.  Neck stretch to the front    Sit up straight in a firm chair, or stand up straight. Look straight ahead. If you're standing, keep your feet about hip-width apart.  Slowly bend your head forward without moving your shoulders.  Hold for 15 to 30 seconds, then return to your starting position.  Repeat 2 to 4 times.  Back press    Stand with your back 10 to 12 inches away from a wall.  Lean into the wall until your back is against it. Press your lower back " against the wall by pulling in your stomach muscles.  Slowly slide down until your knees are slightly bent, pressing your lower back against the wall.  Hold for at least 6 seconds, then slide back up the wall.  Repeat 8 to 12 times.  Over time, work up to holding this position for as much as 1 minute.  Trunk twist    Sit on the floor with your legs crossed. If that's not comfortable, you can sit on a folded blanket so your bottom is a few inches off the floor. Or you can sit on a chair with your knees hip-width apart and your feet flat on the floor.  Reach your left hand toward your right knee. You can place your right hand at your side for support.  Slowly twist your body (trunk) to your right.  Relax and return to your starting position.  Repeat 2 to 4 times.  Switch your hands and twist to your left.  Repeat 2 to 4 times.  Pelvic rocking on hands and knees    Start on your hands and knees. Place your wrists directly below your shoulders and your knees below your hips.  Breathe in slowly. Tuck your head downward and round your back up, making a curve with your back in the shape of the letter C. Hold this position for about 6 seconds.  Breathe out slowly and bring your head back up. Relax, keeping your back straight. (Don't allow it to curve toward the floor.) Hold for about 6 seconds.  Repeat 8 to 12 times, gently rocking your pelvis.  Pelvic tilt    Lie on your back with your knees bent and your feet flat on the floor.  Tighten your belly muscles by pulling your belly button in toward your spine. Press your lower back to the floor. You should feel your hips and pelvis rock back.  Hold for 6 seconds while breathing smoothly, and then relax.  Repeat 8 to 12 times.  Do this exercise only during the first 4 months of pregnancy. After this point, lying on your back is not recommended, because it can cause blood flow problems for you and your baby.  Backward stretch    Start on your hands and knees with your knees 8 to  10 inches apart, hands directly below your shoulders, and arms and back straight.  Keeping your arms straight, slowly lower your buttocks toward your heels and tuck your head toward your knees. Hold for 15 to 30 seconds.  Slowly return to the starting position.  Repeat 2 to 4 times.  Forward bend    Sit comfortably in a chair, with your arms relaxed.  Slowly bend forward, allowing your arms to hang down. Lean only as far as you can without feeling discomfort or pressure on your belly.  Hold for 15 to 30 seconds and then slowly sit up straight.  Repeat 2 to 4 times or to your comfort level.  Donkey kick    Start on your hands and knees. Place your hands directly below your shoulders, and keep your arms straight.  Tighten your belly muscles by pulling your belly button in toward your spine. Keep breathing normally, and don't hold your breath.  Lift one knee and bring it toward your elbow.  Slowly extend that leg behind you without completely straightening it. Be careful not to let your hip drop down. Avoid arching your back.  Hold your leg behind you for about 6 seconds.  Return to your starting position.  Repeat 8 to 12 times for each leg.  Tailor sitting    Sit on the floor.  Bring your feet close to your body while crossing your ankles.  Keep your back straight. Relax your legs and let your knees drop toward the floor.  Hold this position for as long as you are comfortable.  Toe reach    Sit on the floor with your back straight, legs about 12 inches apart, and feet relaxed outward.  Stretch your hands forward toward your right foot, then sit up.  Stretch your hands straight forward, then sit up.  Stretch your hands forward toward your left foot, then sit up.  Hold each stretch for 15 to 30 seconds.  Repeat 2 to 4 times.  Follow-up care is a key part of your treatment and safety. Be sure to make and go to all appointments, and call your doctor if you are having problems. It's also a good idea to know your test  results and keep a list of the medicines you take.  Current as of: July 10, 2023               Content Version: 14.0    1690-5433 [employer].   Care instructions adapted under license by your healthcare professional. If you have questions about a medical condition or this instruction, always ask your healthcare professional. [employer] disclaims any warranty or liability for your use of this information.                                                       If you have any questions regarding your visit, Please contact your care team.     [employer]: [phone]  To Schedule an Appointment 24/7  Call: [phone] TELEPHONE NUM[facility]     Any Colleen- JENNIFER CNP      Maria L Jamil-Surgery Scheduler  Olga-Surgery Scheduler         Monday 7:30am-2:00pm    Tuesday 7:30am-4:00pm    Wednesday 7:30am-2:00pm    Thursday 7:30am-11:00am    Friday 7:30am-2:00pm [address] [address]  Phone- [phone]   Fax- [phone]     Imaging Scheduling all locations  [phone]    [facility] Labor and Delivery  [address] [place] [address]   [place] [address] [phone]         Urgent Care locations:  [facility]   Monday-Friday  10 am - 8 pm  Saturday and Sunday   9 am - 5 pm     [phone]   If you need a medication refill, please contact your pharmacy. Please allow 3 business days for your refill to be completed.  As always, Thank you for trusting us with your healthcare needs!      see additional instructions from your care team below

## 2024-03-15 ENCOUNTER — PRENATAL OFFICE VISIT (OUTPATIENT)
Dept: OBGYN | Facility: CLINIC | Age: 29
End: 2024-03-15
Payer: COMMERCIAL

## 2024-03-15 ENCOUNTER — ANCILLARY PROCEDURE (OUTPATIENT)
Dept: CARDIOLOGY | Facility: CLINIC | Age: 29
End: 2024-03-15
Attending: OBSTETRICS & GYNECOLOGY
Payer: COMMERCIAL

## 2024-03-15 VITALS
BODY MASS INDEX: 30.55 KG/M2 | WEIGHT: 201.6 LBS | DIASTOLIC BLOOD PRESSURE: 83 MMHG | HEART RATE: 94 BPM | SYSTOLIC BLOOD PRESSURE: 129 MMHG | OXYGEN SATURATION: 100 % | HEIGHT: 68 IN

## 2024-03-15 DIAGNOSIS — O99.011 ANEMIA DURING PREGNANCY IN FIRST TRIMESTER: ICD-10-CM

## 2024-03-15 DIAGNOSIS — Z86.79 HISTORY OF HYPERTENSION: ICD-10-CM

## 2024-03-15 DIAGNOSIS — O09.90 HIGH-RISK PREGNANCY, UNSPECIFIED TRIMESTER: ICD-10-CM

## 2024-03-15 DIAGNOSIS — O09.90 HIGH RISK PREGNANCY, ANTEPARTUM: Primary | ICD-10-CM

## 2024-03-15 LAB — LVEF ECHO: NORMAL

## 2024-03-15 PROCEDURE — 36415 COLL VENOUS BLD VENIPUNCTURE: CPT | Performed by: NURSE PRACTITIONER

## 2024-03-15 PROCEDURE — 93306 TTE W/DOPPLER COMPLETE: CPT | Performed by: INTERNAL MEDICINE

## 2024-03-15 PROCEDURE — 99207 PR PRENATAL VISIT: CPT | Performed by: NURSE PRACTITIONER

## 2024-03-15 PROCEDURE — 82105 ALPHA-FETOPROTEIN SERUM: CPT | Mod: 90 | Performed by: NURSE PRACTITIONER

## 2024-03-15 PROCEDURE — 99000 SPECIMEN HANDLING OFFICE-LAB: CPT | Performed by: NURSE PRACTITIONER

## 2024-03-17 LAB
# FETUSES US: NORMAL
AFP MOM SERPL: 1.8
AFP SERPL-MCNC: 62 NG/ML
AGE - REPORTED: 28.8 YR
CURRENT SMOKER: NO
FAMILY MEMBER DISEASES HX: NO
GA METHOD: NORMAL
GA: NORMAL WK
IDDM PATIENT QL: NO
INTEGRATED SCN PATIENT-IMP: NORMAL
SPECIMEN DRAWN SERPL: NORMAL

## 2024-04-02 ENCOUNTER — HOSPITAL ENCOUNTER (OUTPATIENT)
Dept: ULTRASOUND IMAGING | Facility: CLINIC | Age: 29
Discharge: HOME | End: 2024-04-02
Attending: OBSTETRICS & GYNECOLOGY
Payer: COMMERCIAL

## 2024-04-02 ENCOUNTER — OFFICE VISIT (OUTPATIENT)
Dept: MATERNAL FETAL MEDICINE | Facility: CLINIC | Age: 29
End: 2024-04-02
Attending: OBSTETRICS & GYNECOLOGY
Payer: COMMERCIAL

## 2024-04-02 DIAGNOSIS — I15.2 BENIGN SECONDARY HYPERTENSION DUE TO PRIMARY ALDOSTERONISM (H): ICD-10-CM

## 2024-04-02 DIAGNOSIS — O09.90 HIGH-RISK PREGNANCY, UNSPECIFIED TRIMESTER: ICD-10-CM

## 2024-04-02 DIAGNOSIS — D25.9 UTERINE FIBROIDS AFFECTING PREGNANCY, SECOND TRIMESTER: Primary | ICD-10-CM

## 2024-04-02 DIAGNOSIS — E26.09 BENIGN SECONDARY HYPERTENSION DUE TO PRIMARY ALDOSTERONISM (H): ICD-10-CM

## 2024-04-02 DIAGNOSIS — E26.09 PRIMARY ALDOSTERONISM (H): ICD-10-CM

## 2024-04-02 DIAGNOSIS — O34.12 UTERINE FIBROIDS AFFECTING PREGNANCY, SECOND TRIMESTER: Primary | ICD-10-CM

## 2024-04-02 PROCEDURE — 76811 OB US DETAILED SNGL FETUS: CPT | Mod: 26 | Performed by: OBSTETRICS & GYNECOLOGY

## 2024-04-02 PROCEDURE — 76811 OB US DETAILED SNGL FETUS: CPT

## 2024-04-02 NOTE — NURSING NOTE
Pt at [facility] for ultrasound- see detailed report under imaging tab.  Pt without complaints- not feeling fetal movement yet but denies contractions, cramping, loss of fluid and bleeding.  SBAR given to MD.

## 2024-04-02 NOTE — PROGRESS NOTES
Please see the imaging tab for details of the ultrasound performed today.    Marta Mckenna MD  Specialist in Maternal-Fetal Medicine

## 2024-04-11 NOTE — PATIENT INSTRUCTIONS
"Weeks 18 to 22 of Your Pregnancy: Care Instructions  At this stage you may find that your nausea and fatigue are gone. You may feel better overall and have more energy. But you might now also have some new discomforts, like sleep problems or leg cramps.    You may start to feel your baby move. These movements can feel like butterflies or bubbles.    Babies at this stage can now suck their thumbs.    Get some exercise every day.  And avoid caffeine late in the day.     Take a warm shower or bath before bed.  Try relaxation exercises to calm your mind and body.     Use extra pillows.  They can help you get comfortable.     Don't use sleeping pills or alcohol.  They could harm your baby.     For leg cramps, stretch and apply heat.  A warm bath, leg warmers, a heating pad, or a hot water bottle can help with muscle aches.   Stretches for leg cramps    Straighten your leg and bend your foot (flex your ankle) slowly upward, toward your knee. Bend your toes up and down.    Stand on a flat surface. Stretch your toes upward. For balance, hold on to the wall or something stable. If it feels okay, take small steps walking on your heels.  Follow-up care is a key part of your treatment and safety. Be sure to make and go to all appointments, and call your doctor if you are having problems. It's also a good idea to know your test results and keep a list of the medicines you take.  Where can you learn more?  Go to https://www.Samesurf.net/patiented  Enter W603 in the search box to learn more about \"Weeks 18 to 22 of Your Pregnancy: Care Instructions.\"  Current as of: July 10, 2023               Content Version: 14.0    7649-2218 [employer].   Care instructions adapted under license by your healthcare professional. If you have questions about a medical condition or this instruction, always ask your healthcare professional. [employer] disclaims any warranty or liability for your use of this " information.                                                     If you have any questions regarding your visit, Please contact your care team.     [employer]: [phone]  To Schedule an Appointment 24/7  Call: [phone] HOURS TELEPHONE NUMBER     Any Macias- APRN CNP      Ana MariaGuillermo Jamil-Surgery Scheduler  Olga-Surgery Scheduler         Monday 7:30am-2:00pm    Tuesday 7:30am-4:00pm    Wednesday 7:30am-2:00pm    Thursday 7:30am-11:00am    Friday 7:30am-2:00pm [address] [address]  Phone- [phone]   Fax- [phone]     Imaging Scheduling all locations  [phone]    [facility] Labor and Delivery  [address] [place] [address]   [place] [address] [phone]         Urgent Care locations:  [facility]   Monday-Friday  10 am - 8 pm  Saturday and Sunday   9 am - 5 pm     [phone]   If you need a medication refill, please contact your pharmacy. Please allow 3 business days for your refill to be completed.  As always, Thank you for trusting us with your healthcare needs!      see additional instructions from your care team below

## 2024-04-12 ENCOUNTER — PRENATAL OFFICE VISIT (OUTPATIENT)
Dept: OBGYN | Facility: CLINIC | Age: 29
End: 2024-04-12
Payer: COMMERCIAL

## 2024-04-12 VITALS
OXYGEN SATURATION: 93 % | HEART RATE: 89 BPM | SYSTOLIC BLOOD PRESSURE: 114 MMHG | HEIGHT: 68 IN | WEIGHT: 209.4 LBS | BODY MASS INDEX: 31.74 KG/M2 | DIASTOLIC BLOOD PRESSURE: 78 MMHG

## 2024-04-12 DIAGNOSIS — O99.011 ANEMIA DURING PREGNANCY IN FIRST TRIMESTER: ICD-10-CM

## 2024-04-12 DIAGNOSIS — O09.90 HIGH RISK PREGNANCY, ANTEPARTUM: Primary | ICD-10-CM

## 2024-04-12 PROCEDURE — 99207 PR PRENATAL VISIT: CPT | Performed by: NURSE PRACTITIONER

## 2024-04-12 PROCEDURE — 36415 COLL VENOUS BLD VENIPUNCTURE: CPT | Performed by: NURSE PRACTITIONER

## 2024-04-12 PROCEDURE — 80048 BASIC METABOLIC PNL TOTAL CA: CPT | Performed by: NURSE PRACTITIONER

## 2024-04-12 NOTE — PROGRESS NOTES
Patient presents for routine prenatal visit. Prenatal flowsheet reviewed and updated as needed.  Denies vaginal bleeding, loss of fluid, contractions or cramping. Denies headache, nausea/vomiting, upper abdominal pain, vision changes, lower extremity swelling, chest pain or shortness of breath.     Anticipatory guidance appropriate for gestational age reviewed.  PE: See OB vitals    Pregnancy complicated by:  S/P Adrenalectomy (with resolution of her HTN)-follows with Endocrine at [place]. ECHO and Level 2 ultrasound normal. Needs EKG and discussed scheduling with primary care. Discussed rationale.  Has been monitoring blood pressure at home-normal range.  Due for BMP today.    Routine prenatal care:  Discussed labs on return to clinic.    Questions asked and answered. Next OB visit in 4 week(s) with OB Physician.    Any RODRIGUEZ CNP

## 2024-04-13 LAB
ANION GAP SERPL CALCULATED.3IONS-SCNC: 12 MMOL/L (ref 7–15)
BUN SERPL-MCNC: 7.2 MG/DL (ref 6–20)
CALCIUM SERPL-MCNC: 9.3 MG/DL (ref 8.6–10)
CHLORIDE SERPL-SCNC: 100 MMOL/L (ref 98–107)
CREAT SERPL-MCNC: 0.64 MG/DL (ref 0.51–0.95)
DEPRECATED HCO3 PLAS-SCNC: 21 MMOL/L (ref 22–29)
EGFRCR SERPLBLD CKD-EPI 2021: >90 ML/MIN/1.73M2
GLUCOSE SERPL-MCNC: 124 MG/DL (ref 70–99)
POTASSIUM SERPL-SCNC: 3.9 MMOL/L (ref 3.4–5.3)
SODIUM SERPL-SCNC: 133 MMOL/L (ref 135–145)

## 2024-05-13 ENCOUNTER — PRENATAL OFFICE VISIT (OUTPATIENT)
Dept: OBGYN | Facility: CLINIC | Age: 29
End: 2024-05-13
Payer: COMMERCIAL

## 2024-05-13 VITALS
DIASTOLIC BLOOD PRESSURE: 76 MMHG | OXYGEN SATURATION: 99 % | BODY MASS INDEX: 32.26 KG/M2 | HEART RATE: 106 BPM | WEIGHT: 212.2 LBS | SYSTOLIC BLOOD PRESSURE: 108 MMHG

## 2024-05-13 DIAGNOSIS — R73.09 ABNORMAL GLUCOSE TOLERANCE TEST: Primary | ICD-10-CM

## 2024-05-13 DIAGNOSIS — O09.90 HIGH RISK PREGNANCY, ANTEPARTUM: Primary | ICD-10-CM

## 2024-05-13 LAB
GLUCOSE 1H P 50 G GLC PO SERPL-MCNC: 147 MG/DL (ref 70–129)
HGB BLD-MCNC: 9.8 G/DL (ref 11.7–15.7)
T PALLIDUM AB SER QL: NONREACTIVE

## 2024-05-13 PROCEDURE — 82950 GLUCOSE TEST: CPT | Performed by: OBSTETRICS & GYNECOLOGY

## 2024-05-13 PROCEDURE — 99207 PR PRENATAL VISIT: CPT | Performed by: OBSTETRICS & GYNECOLOGY

## 2024-05-13 PROCEDURE — 86780 TREPONEMA PALLIDUM: CPT | Performed by: OBSTETRICS & GYNECOLOGY

## 2024-05-13 PROCEDURE — 85018 HEMOGLOBIN: CPT | Performed by: OBSTETRICS & GYNECOLOGY

## 2024-05-13 PROCEDURE — 36415 COLL VENOUS BLD VENIPUNCTURE: CPT | Performed by: OBSTETRICS & GYNECOLOGY

## 2024-05-13 NOTE — PROGRESS NOTES
Patient presents for routine prenatal visit at 25w2d.  Patient without complaint.   PE: See OB vitals  There is no height or weight on file to calculate BMI.    No vaginal bleeding, no contractions, no leakage of fluid  No nausea/vomiting. No heartburn  No vaginal discharge. No dysuria.   No headache, vision changes, lower extremity swelling, upper abdominal pain, chest pain, shortness of breath  Tdap planned next visit    Questions asked and answered.      Marcin Avery MD FACOG

## 2024-05-16 ENCOUNTER — LAB (OUTPATIENT)
Dept: LAB | Facility: CLINIC | Age: 29
End: 2024-05-16
Payer: COMMERCIAL

## 2024-05-16 DIAGNOSIS — R73.09 ABNORMAL GLUCOSE TOLERANCE TEST: ICD-10-CM

## 2024-05-16 LAB
GESTATIONAL GTT 1 HR POST DOSE: 153 MG/DL (ref 60–179)
GLUCOSE P FAST SERPL-MCNC: 78 MG/DL (ref 60–94)

## 2024-05-16 PROCEDURE — 82952 GTT-ADDED SAMPLES: CPT

## 2024-05-16 PROCEDURE — 82951 GLUCOSE TOLERANCE TEST (GTT): CPT

## 2024-05-16 PROCEDURE — 36415 COLL VENOUS BLD VENIPUNCTURE: CPT

## 2024-05-17 LAB
GESTATIONAL GTT 2 HR POST DOSE: 122 MG/DL (ref 60–154)
GESTATIONAL GTT 3 HR POST DOSE: 142 MG/DL (ref 60–139)

## 2024-05-31 NOTE — PATIENT INSTRUCTIONS
Weeks 26 to 30 of Your Pregnancy: Care Instructions  You're starting your last trimester. You'll probably feel your baby moving around more. Your back may ache as your body gets used to your baby's size and length. Take care of yourself, and pay attention to what your body needs.    Talk to your doctor about getting the Tdap shot. It will help protect your  against whooping cough (pertussis). Also ask your doctor about flu and COVID-19 shots if you haven't had them yet. If your blood type is Rh negative, you may be given a shot of Rh immune globulin (such as RhoGAM). It can help prevent problems for your baby.    You may have Román-Caceres contractions. They are single or several strong contractions without a pattern. These are practice contractions but not the start of labor.  Be kind to yourself.     Take breaks when you're tired.  Change positions often. Don't sit for too long or stand for too long.  At work, rest during breaks if you can. If you don't get breaks, talk to your doctor about writing a letter to your employer to request them.  Avoid fumes, chemicals, and tobacco smoke.  Be sexual if you want to.     You may be interested in sex, or you may not. Everyone is different.  Sex is okay unless your doctor tells you not to.  Your belly can make it hard to find good positions for sex. [place] and explore.  Watch for signs of  labor.    These signs include:   Menstrual-like cramps. Or you may have pain or pressure in your pelvis that happens in a pattern.  About 6 or more contractions in an hour (even after rest and a glass of water).  A low, dull backache that doesn't go away when you change positions.  An increase or change in vaginal discharge.  Light vaginal bleeding or spotting.  Your water breaking.  Know what to do if you think you are having contractions.     Drink 1 or 2 glasses of water.  Lie down on your left side for at least an hour.  While on your side, feel the top of your  "belly to see if it's tight.  Write down your contractions for an hour. Time how long it is from the start of one contraction to the start of the next.  Call your doctor if you have regular contractions.  Follow-up care is a key part of your treatment and safety. Be sure to make and go to all appointments, and call your doctor if you are having problems. It's also a good idea to know your test results and keep a list of the medicines you take.  Where can you learn more?  Go to https://www.[employer]be Scientific.net/patiented  Enter S999 in the search box to learn more about \"Weeks 26 to 30 of Your Pregnancy: Care Instructions.\"  Current as of: July 10, 2023               Content Version: 14.0    4342-5059 [employer].   Care instructions adapted under license by your healthcare professional. If you have questions about a medical condition or this instruction, always ask your healthcare professional. [employer] disclaims any warranty or liability for your use of this information.      Counting Your Baby's Kicks: Care Instructions  Overview     Counting your baby's kicks is one way your doctor can tell that your baby is healthy. You will probably feel your baby move for the first time between 16 and 22 weeks. The movement may feel like flutters rather than kicks. Your baby may move more at certain times of the day. When you are active, you may notice less kicking than when you are resting. At your prenatal visits, your doctor will ask whether the baby is active.  In your last trimester, your doctor may ask you to count the number of times you feel your baby move.  Follow-up care is a key part of your treatment and safety. Be sure to make and go to all appointments, and call your doctor if you are having problems. It's also a good idea to know your test results and keep a list of the medicines you take.  How do you count fetal kicks?  A common method of checking your baby's movement is to note the length " "of time it takes to count 10 movements (such as kicks, flutters, or rolls).  Pick your baby's most active time of day to count. This may be any time from morning to evening.  If you don't feel 10 movements in an hour, have something to eat or drink and count for another hour. If you don't feel at least 10 movements in the 2-hour period, call your doctor.  Do not use an at-home Doppler heart monitor in place of counting fetal movements.  When should you call for help?   Call your doctor now or seek immediate medical care if:    You feel fewer than 10 movements in a 2-hour period.     You noticed that your baby has stopped moving or is moving less than normal.   Watch closely for changes in your health, and be sure to contact your doctor if you have any problems.  Where can you learn more?  Go to https://www.SiVerion.net/patiented  Enter U048 in the search box to learn more about \"Counting Your Baby's Kicks: Care Instructions.\"  Current as of: July 10, 2023               Content Version: 14.0    8411-0630 [employer].   Care instructions adapted under license by your healthcare professional. If you have questions about a medical condition or this instruction, always ask your healthcare professional. [employer] disclaims any warranty or liability for your use of this information.                                                         If you have any questions regarding your visit, Please contact your care team.     [employer] Access Services: [phone]  To Schedule an Appointment 24/7  Call: [phone] HOURS TELEPHONE NUMBER     Any Jamil-Surgery Scheduler  Olga-Surgery Scheduler         Monday 7:30am-2:00pm    Tuesday 7:30am-4:00pm    Wednesday 7:30am-2:00pm    Thursday 7:30am-11:00am    Friday 7:30am-2:00pm [facility]  [address] [place], [place] " [address]  Phone- [phone]   Fax- [phone]     Imaging Scheduling all locations  [phone]    [facility] Labor and Delivery  [address] [place] [address] [place] [address] [phone]         Urgent Care locations:  [facility]   Monday-Friday  10 am - 8 pm  Saturday and Sunday   9 am - 5 pm     [phone]   If you need a medication refill, please contact your pharmacy. Please allow 3 business days for your refill to be completed.  As always, Thank you for trusting us with your healthcare needs!      see additional instructions from your care team below

## 2024-06-03 ENCOUNTER — PRENATAL OFFICE VISIT (OUTPATIENT)
Dept: OBGYN | Facility: CLINIC | Age: 29
End: 2024-06-03
Payer: COMMERCIAL

## 2024-06-03 VITALS
DIASTOLIC BLOOD PRESSURE: 77 MMHG | BODY MASS INDEX: 32.92 KG/M2 | OXYGEN SATURATION: 100 % | HEART RATE: 106 BPM | HEIGHT: 68 IN | SYSTOLIC BLOOD PRESSURE: 110 MMHG | WEIGHT: 217.2 LBS

## 2024-06-03 DIAGNOSIS — O09.90 HIGH RISK PREGNANCY, ANTEPARTUM: Primary | ICD-10-CM

## 2024-06-03 DIAGNOSIS — Z23 NEED FOR TDAP VACCINATION: ICD-10-CM

## 2024-06-03 PROCEDURE — 90715 TDAP VACCINE 7 YRS/> IM: CPT | Performed by: NURSE PRACTITIONER

## 2024-06-03 PROCEDURE — 99207 PR PRENATAL VISIT: CPT | Performed by: NURSE PRACTITIONER

## 2024-06-03 PROCEDURE — 90471 IMMUNIZATION ADMIN: CPT | Performed by: NURSE PRACTITIONER

## 2024-06-18 ENCOUNTER — PRENATAL OFFICE VISIT (OUTPATIENT)
Dept: OBGYN | Facility: CLINIC | Age: 29
End: 2024-06-18
Payer: COMMERCIAL

## 2024-06-18 ENCOUNTER — MEDICAL CORRESPONDENCE (OUTPATIENT)
Dept: OBGYN | Facility: CLINIC | Age: 29
End: 2024-06-18

## 2024-06-18 VITALS
DIASTOLIC BLOOD PRESSURE: 79 MMHG | HEART RATE: 109 BPM | WEIGHT: 215 LBS | BODY MASS INDEX: 32.69 KG/M2 | SYSTOLIC BLOOD PRESSURE: 120 MMHG

## 2024-06-18 DIAGNOSIS — O99.013 ANEMIA DURING PREGNANCY IN THIRD TRIMESTER: ICD-10-CM

## 2024-06-18 DIAGNOSIS — O09.90 HIGH RISK PREGNANCY, ANTEPARTUM: Primary | ICD-10-CM

## 2024-06-18 PROCEDURE — 99207 PR PRENATAL VISIT: CPT | Performed by: OBSTETRICS & GYNECOLOGY

## 2024-06-18 NOTE — PROGRESS NOTES
Presents for routine  appointment.    +FM  Questions about epidural and pain management options in labor  No concerns or other questions.  No vaginal bleeding, loss of fluid, CTX    /79   Pulse 109   Wt 97.5 kg (215 lb)   LMP 2023 (Exact Date)   BMI 32.69 kg/m        Electronic Fetal Monitoring:  Baseline rate difficult to assertain on limited NST and audible arrhythmia; audible baseline in 130s with every 3rd beat skipped . Tracing unable to be appropriately interpreted due to discrepancies in audible arrhythmia and what is being picked up on the monitor    Assessment:   A/P:  28 year old  at 30w3d ALPA visit, abnormal doptones in office concerning for arrhythmia     -Audible fetal arrhythmia on doptones today, NST unable to be interpreted due to variance in audble tones and what tracing picking up; broken tracing. Given this, strongly recommend evaluation in L+D with continuous monitoring, labs and imaging as appropriate. Due to [facility] on divert and proximity of L+D units, recommend going to [facility]. Given maternal stability and close proximity of hospital, discussed going by personal car as this felt to be fastest way to get to hospital. On-call OBGYN at [facility] given report, will be awaiting patient arrival. Patient in agreement with plan, all questions answered.    Pregnancy complicated by:  -S/p Adrenalectomy (with resolution of her HTN)-follows with Endocrine at [place]. ECHO and Level 2 ultrasound normal. Has not completed EKG, will need to schedule at later date. Has been monitoring blood pressure at home-remain in normal range. BMP and growth ultrasound ordered today  -Anemia: Increased to BID dosing for iron supplements. Repeat hgb today. IV iron may be recommended based on labs today    Routine prenatal care:  -Tdap given.     Follow up in 1 week, sooner if indicated.    Enedina Rock, DO

## 2024-07-04 NOTE — PROGRESS NOTES
Patient presents for routine prenatal visit. Prenatal flowsheet reviewed and updated as needed.  Denies vaginal bleeding, loss of fluid, contractions or cramping. Denies headache, nausea/vomiting, upper abdominal pain, vision changes, lower extremity swelling, chest pain or shortness of breath.   Questions relief measures for hemorrhoids and leg cramping at night and these are discussed.    Anticipatory guidance appropriate for gestational age reviewed.  PE: See OB vitals    Pregnancy complicated by:  S/P Adrenalectomy (with resolution of her HTN)-follows with Endocrine at [place]. ECHO and Level 2 ultrasound normal. Has not completed EKG and discussed scheduling with primary care. Has been monitoring blood pressure at home-remain in normal range.   Anemia: Increased to BID dosing for iron supplements.   Next visit: check BMP, HGB and order growth ultrasound. Consider iron infusion if appropriate.    Routine prenatal care:  Tdap given.     Questions asked and answered. Next OB visit in 2 week(s) - discussed must be with OB Physician.    Any RODRIGUEZ CNP  
Prior to immunization administration, verified patients identity using patient s name and date of birth. Please see Immunization Activity for additional information.     Screening Questionnaire for Adult Immunization    Are you sick today?   No   Do you have allergies to medications, food, a vaccine component or latex?   No   Have you ever had a serious reaction after receiving a vaccination?   No   Do you have a long-term health problem with heart, lung, kidney, or metabolic disease (e.g., diabetes), asthma, a blood disorder, no spleen, complement component deficiency, a cochlear implant, or a spinal fluid leak?  Are you on long-term aspirin therapy?   No   Do you have cancer, leukemia, HIV/AIDS, or any other immune system problem?   No   Do you have a parent, brother, or sister with an immune system problem?   No   In the past 3 months, have you taken medications that affect  your immune system, such as prednisone, other steroids, or anticancer drugs; drugs for the treatment of rheumatoid arthritis, Crohn s disease, or psoriasis; or have you had radiation treatments?   No   Have you had a seizure, or a brain or other nervous system problem?   No   During the past year, have you received a transfusion of blood or blood    products, or been given immune (gamma) globulin or antiviral drug?   No   For women: Are you pregnant or is there a chance you could become       pregnant during the next month?   Yes   Have you received any vaccinations in the past 4 weeks?   No     Immunization questionnaire was positive for at least one answer.  Notified Any RODRIGUEZ CNP.      Patient instructed to remain in clinic for 15 minutes afterwards, and to report any adverse reactions.     Screening performed by Ana Maria Tenorio CMA on 6/3/2024 at 1:37 PM.        
<-- Click to add NO significant Past Surgical History

## 2024-08-07 ENCOUNTER — PRENATAL OFFICE VISIT (OUTPATIENT)
Dept: OBGYN | Facility: CLINIC | Age: 29
End: 2024-08-07
Payer: COMMERCIAL

## 2024-08-07 VITALS
WEIGHT: 195.2 LBS | HEART RATE: 77 BPM | DIASTOLIC BLOOD PRESSURE: 85 MMHG | BODY MASS INDEX: 29.58 KG/M2 | SYSTOLIC BLOOD PRESSURE: 126 MMHG | HEIGHT: 68 IN | OXYGEN SATURATION: 98 %

## 2024-08-07 LAB — HGB BLD-MCNC: 11 G/DL (ref 11.7–15.7)

## 2024-08-07 PROCEDURE — 85018 HEMOGLOBIN: CPT | Performed by: NURSE PRACTITIONER

## 2024-08-07 PROCEDURE — 36415 COLL VENOUS BLD VENIPUNCTURE: CPT | Performed by: NURSE PRACTITIONER

## 2024-08-07 RX ORDER — ACETAMINOPHEN AND CODEINE PHOSPHATE 120; 12 MG/5ML; MG/5ML
0.35 SOLUTION ORAL DAILY
Qty: 84 TABLET | Refills: 3 | Status: SHIPPED | OUTPATIENT
Start: 2024-08-07

## 2024-08-07 ASSESSMENT — PATIENT HEALTH QUESTIONNAIRE - PHQ9
SUM OF ALL RESPONSES TO PHQ QUESTIONS 1-9: 1
SUM OF ALL RESPONSES TO PHQ QUESTIONS 1-9: 1

## 2024-08-07 NOTE — LETTER
August 7, 2024      Philipp June  [address] [address]  [place] [address]          To Whom It May Concern:      Philipp June was seen in our clinic. She may return to work on Monday September 9, 2024 without restrictions.        Sincerely,          JENNIFER Ryan CNP

## 2024-08-07 NOTE — PATIENT INSTRUCTIONS
If you have any questions regarding your visit, Please contact your care team.     [employer] Services: [phone]  To Schedule an Appointment 24/7  Call: [phone] HOURS TELEPHONE NUMBER     Any Macias- APRN CNP      Maria L Jamil-Surgery Scheduler  Olga-Surgery Scheduler         Monday 7:30am-2:00pm    Tuesday 7:30am-4:00pm    Wednesday 7:30am-2:00pm    Thursday 7:30am-11:00am    Friday 7:30am-2:00pm [address] [address]  Phone- [phone]   Fax- [phone]     Imaging Scheduling all locations  [phone]    [facility] Labor and Delivery  [address] [place] [address]   [place] [address] [phone]         Urgent Care locations:  [facility]   Monday-Friday  10 am - 8 pm  Saturday and Sunday   9 am - 5 pm     [phone]   If you need a medication refill, please contact your pharmacy. Please allow 3 business days for your refill to be completed.  As always, Thank you for trusting us with your healthcare needs!      see additional instructions from your care team below

## 2024-08-10 ENCOUNTER — MYC MEDICAL ADVICE (OUTPATIENT)
Dept: OBGYN | Facility: CLINIC | Age: 29
End: 2024-08-10
Payer: COMMERCIAL

## 2024-08-14 ENCOUNTER — MEDICAL CORRESPONDENCE (OUTPATIENT)
Dept: HEALTH INFORMATION MANAGEMENT | Facility: CLINIC | Age: 29
End: 2024-08-14

## 2024-08-29 NOTE — PROGRESS NOTES
Philipp is here for a postpartum checkup.  Pregnancy was: Complicated by fetal arrhythmia and delivery by c/section at 30 weeks. He was in the NICU and went home about 2 weeks ago. Cardiology did not find any etiology of her arrhythmia.    OB History    Para Term  AB Living   1 1 0 1 0 1   SAB IAB Ectopic Multiple Live Births   0 0 0 0 1      # Outcome Date GA Lbr Florentin/2nd Weight Sex Type Anes PTL Lv   1  / 30w3d  1.67 kg (3 lb 10.9 oz) M CS-Unspec   ANDRESSA      Name: Sahil Segal Afolbi      Apgar1: 7  Apgar5: 9      Since delivery, she has been breast feeding/pumping.  She has not had a normal menses.  She has not had intercourse.  Patient screened for postpartum depression and complaints are NEGATIVE. Screening has also been completed for intimate partner violence. She would like to discuss contraception. Last HGB in hospital was under 8. Did take oral iron for few weeks after delivery.      O: This is a well appearing female in no acute distress. Answers questions and maintains eye contact appropriately. Vital signs noted.  RESPIRATORY: Clear to auscultation bilaterally.  CV: Regular rate and rhythm without murmur, gallop, rub  ABDOMEN: Soft, nontender, nondistended.  Incision: intact, no erythema, induration or discharge   Vulva: No external lesions, normal hair distribution, no adenopathy  BUS:  Normal, no masses noted  Vagina: Moist, pink, no abnormal discharge, well rugated, no lesions  Cervix: Pink, nulliparous, midline. Without cervical motion tenderness.  Uterus: Normal size and shape, non-tender, mobile  Ovaries: No masses, non-tender, mobile    A/P:  (Z39.2) Routine postpartum follow-up  (primary encounter diagnosis)  Comment: Check HGB today. Discussed her questions for management in subsequent pregnancies.  Reviewed progesterone only and barrier options as she is nursing including mini pill, Depo Provera, IUD, Nexplanon, condoms. At this time she desires Micronor. Discussed when  Pre-procedure teaching reinforced. to start, taking it regularly, possible side effects.   Plan: norethindrone (MICRONOR) 0.35 MG tablet,         Hemoglobin          Any RODRIGUEZ CNP      Answers submitted by the patient for this visit:  Patient Health Questionnaire (Submitted on 8/7/2024)  PHQ9 TOTAL SCORE: 1

## 2024-10-17 ENCOUNTER — MEDICAL CORRESPONDENCE (OUTPATIENT)
Dept: HEALTH INFORMATION MANAGEMENT | Facility: CLINIC | Age: 29
End: 2024-10-17
Payer: COMMERCIAL

## 2024-11-18 NOTE — PATIENT INSTRUCTIONS
If you have labs or imaging done, the results will automatically release in FOLUP without an interpretation.  Your health care professional will review those results and send an interpretation with recommendations as soon as possible, but this may be 1-3 business days.    If you have any questions regarding your visit, please contact your care team.     [employer] Access Services: [phone]  [facility] CLINIC HOURS TELEPHONE NUMBER   Aneta Ayala, NANDO Burgess-DYAN Pereira-DYAN Jamil-Surgery Scheduler  Olga-       Monday- [place]  8:00 am-4:00 pm    Tuesday- [place]  8:00 am-4:00 pm    Wednesday- [place] 8:00 am-4:00 pm    Thursday- [place] 8:00 am-4:00 pm    Friday- [place]  8:00 am-4:00 pm [facility]  [address] [place]  [place] [address]  PH: [phone]  Fax: [phone]    Imaging Scheduling all locations  PH: [phone]     [facility] Labor and Delivery  [address] [place] [address] Dr.  [place], [place] [address] [phone]    [facility]  [address] [place] [address]  PH: [phone]     **Surgeries** Our Surgery Schedulers will contact you to schedule. If you do not receive a call within 3 business days, please call [phone].  Urgent Care locations:  [facility]       Monday-Friday   10 am - 8 pm    Saturday and Sunday   9 am - 5 pm   [phone]   If you need a medication refill, please contact your pharmacy. Please allow 3 business days for your refill to be completed.  As always, Thank you for trusting us with your healthcare needs!

## 2024-11-19 ENCOUNTER — OFFICE VISIT (OUTPATIENT)
Dept: OBGYN | Facility: CLINIC | Age: 29
End: 2024-11-19
Payer: COMMERCIAL

## 2024-11-19 VITALS
OXYGEN SATURATION: 100 % | DIASTOLIC BLOOD PRESSURE: 88 MMHG | HEART RATE: 88 BPM | SYSTOLIC BLOOD PRESSURE: 140 MMHG | BODY MASS INDEX: 29.68 KG/M2 | WEIGHT: 195.2 LBS

## 2024-11-19 DIAGNOSIS — B37.31 YEAST INFECTION OF THE VAGINA: ICD-10-CM

## 2024-11-19 DIAGNOSIS — F52.0 HYPOACTIVE SEXUAL DESIRE: ICD-10-CM

## 2024-11-19 DIAGNOSIS — N94.10 DYSPAREUNIA IN FEMALE: Primary | ICD-10-CM

## 2024-11-19 LAB
ALBUMIN UR-MCNC: ABNORMAL MG/DL
APPEARANCE UR: CLEAR
BACTERIA #/AREA URNS HPF: ABNORMAL /HPF
BILIRUB UR QL STRIP: NEGATIVE
CLUE CELLS: ABNORMAL
COLOR UR AUTO: YELLOW
GLUCOSE UR STRIP-MCNC: NEGATIVE MG/DL
HGB UR QL STRIP: NEGATIVE
KETONES UR STRIP-MCNC: NEGATIVE MG/DL
LEUKOCYTE ESTERASE UR QL STRIP: NEGATIVE
MUCOUS THREADS #/AREA URNS LPF: PRESENT /LPF
NITRATE UR QL: NEGATIVE
PH UR STRIP: 6 [PH] (ref 5–7)
RBC #/AREA URNS AUTO: ABNORMAL /HPF
SP GR UR STRIP: 1.02 (ref 1–1.03)
SQUAMOUS #/AREA URNS AUTO: ABNORMAL /LPF
TRICHOMONAS, WET PREP: ABNORMAL
UROBILINOGEN UR STRIP-ACNC: 0.2 E.U./DL
WBC #/AREA URNS AUTO: ABNORMAL /HPF
WBC'S/HIGH POWER FIELD, WET PREP: ABNORMAL
YEAST #/AREA URNS HPF: ABNORMAL /HPF
YEAST, WET PREP: PRESENT

## 2024-11-19 PROCEDURE — 81001 URINALYSIS AUTO W/SCOPE: CPT

## 2024-11-19 PROCEDURE — 99214 OFFICE O/P EST MOD 30 MIN: CPT

## 2024-11-19 PROCEDURE — 87210 SMEAR WET MOUNT SALINE/INK: CPT

## 2024-11-19 RX ORDER — FLUCONAZOLE 150 MG/1
TABLET ORAL
Qty: 2 TABLET | Refills: 0 | Status: SHIPPED | OUTPATIENT
Start: 2024-11-19

## 2024-11-19 NOTE — PROGRESS NOTES
"Subjective:  Philipp is a 29 year old   is here today with the following concerns:    Dyspareunia: since onset of having SIC >1y ago. She became sexually active when she got  in 2023. Before then, she had never had anything in the vagina. She reports pain with insertion and deep penetration for entirety of IC. Pain is in the vagina and not located at entrance or in pelvis. She reports \"the pain is difficult to describe\" when inquired about characterization of the pain. She doesn't have any abnormal bleeding, discharge, fever, or abdominal pain. She is currently breastfeeding and had her baby this summer 2024. She reports healthy, good relationship with , no history of abuse or trauma, adequate stimulation prior to IC, and use of lubricant. Menarche age 16. She has irregular cycles and was diagnosed with PCOS. She reports her periods have always been irregular, but never painful or extremely heavy.  Low desire: she reports she has never had an orgasm and has no desire to have IC. She never has since becoming sexually active. Reports that no stimulation of her clitoris has ever occurred.     ROS: Pertinent ROS as above.    Medical history  OB History    Para Term  AB Living   1 1 0 1 0 1   SAB IAB Ectopic Multiple Live Births   0 0 0 0 1      # Outcome Date GA Lbr Florentin/2nd Weight Sex Type Anes PTL Lv   1  24 30w3d  1.67 kg (3 lb 10.9 oz) M CS-Unspec   ANDRESSA      Name: Sahil Segal Afolbi      Apgar1: 7  Apgar5: 9      Past Medical History:   Diagnosis Date    Primary aldosteronism (H)     Secondary hypertension       Past Surgical History:   Procedure Laterality Date    ADRENALECTOMY Left 2023    IR ADRENAL VENOGRAM BILATERAL  10/17/2022    IR LYMPH NODE BIOPSY  2018       ALL/Meds: Her medication and allergy histories were reviewed and are documented in their appropriate chart areas.    SH: Reviewed and documented in the appropriate area of the chart.  FH: "  Her family history is reviewed and updated in the chart, today.  PMH: Her past medical, surgical, and obstetric histories were reviewed and updated today in the appropriate chart areas.    Objective:  PE: BP (!) 140/88 (BP Location: Left arm, Patient Position: Sitting, Cuff Size: Adult Regular)   Pulse 88   Wt 88.5 kg (195 lb 3.2 oz)   SpO2 100%   Breastfeeding Yes   BMI 29.68 kg/m    Body mass index is 29.68 kg/m .    Pertinent Physical exam findings:    GENERAL: Active, alert, in no acute distress.  SKIN: Clear. No significant rash, abnormal pigmentation or lesions  MS: no gross musculoskeletal defects noted, no edema  PSYCH: Age-appropriate alertness and orientation    Pelvic:       - Ext: Vulva and perineum are normal without lesion, mass or discharge        - Urethra: normal without discharge or scarring        - Bladder: no tenderness, no masses       - Vagina: without discharge and rugated       - Cervix: normal and closed, pink, CMT neg       - Uterus: Normal shape, position and consistency       - Adnexa: Normal without masses or tenderness    A/P:  Oyedayloyda June is a 29 year old  here today with the following concerns:  (N94.10) Dyspareunia in female  (primary encounter diagnosis)  Comment: We discussed the various causes of dyspareunia such as position, vaginal dryness, endometriosis, adnexal mass, pelvic floor dysfunction, infection, PID, relationship factors, fibroids, malignancy, pelvic congestion syndrome, vulvar pain, IC/BPS, mental health. Based on her presenting history and PE, PFD is high on the list of differentials. We discussed initially trying PFPT and seeing if this improves her pain. Informed her PE was normal and there was no resistance or pain with insertion of speculum or during bimanual exam. I am not convinced vaginal dryness as a result of breastfeeding is the cause of her pain due to onset before pregnancy and her use of lubricant. Also, her PE revealed well  lubricated vagina.   Plan: UA Macroscopic with reflex to Microscopic and         Culture - Lab Collect, Wet prep - Clinic         Collect, Physical Therapy  Referral          (F52.0) Hypoactive sexual desire  Plan: We discussed that it is logical to not desire SIC if it is painful and that we need to initially address the pain before addressing her low desire. We also discussed that she should try clitoral stimulation to see if there is any response or orgasm as a result of this. Once her dyspareunia improves, we will follow up on her low desire.    She is agreeable to the plan above and has no further questions or concerns. She did not request a chaperone for the physical exam component of the visit today.     JENNIFER Thomas CNP

## 2024-12-23 ENCOUNTER — MEDICAL CORRESPONDENCE (OUTPATIENT)
Dept: HEALTH INFORMATION MANAGEMENT | Facility: CLINIC | Age: 29
End: 2024-12-23
Payer: COMMERCIAL

## 2025-01-07 ENCOUNTER — OFFICE VISIT (OUTPATIENT)
Dept: FAMILY MEDICINE | Facility: CLINIC | Age: 30
End: 2025-01-07
Payer: COMMERCIAL

## 2025-01-07 VITALS
WEIGHT: 196 LBS | RESPIRATION RATE: 16 BRPM | BODY MASS INDEX: 29.03 KG/M2 | SYSTOLIC BLOOD PRESSURE: 128 MMHG | TEMPERATURE: 96.7 F | HEIGHT: 69 IN | OXYGEN SATURATION: 99 % | DIASTOLIC BLOOD PRESSURE: 86 MMHG | HEART RATE: 76 BPM

## 2025-01-07 DIAGNOSIS — Z00.00 ROUTINE GENERAL MEDICAL EXAMINATION AT A HEALTH CARE FACILITY: Primary | ICD-10-CM

## 2025-01-07 DIAGNOSIS — E55.9 VITAMIN D DEFICIENCY: ICD-10-CM

## 2025-01-07 LAB
BASOPHILS # BLD AUTO: 0 10E3/UL (ref 0–0.2)
BASOPHILS NFR BLD AUTO: 0 %
EOSINOPHIL # BLD AUTO: 0.2 10E3/UL (ref 0–0.7)
EOSINOPHIL NFR BLD AUTO: 4 %
ERYTHROCYTE [DISTWIDTH] IN BLOOD BY AUTOMATED COUNT: 13 % (ref 10–15)
HCT VFR BLD AUTO: 37.6 % (ref 35–47)
HGB BLD-MCNC: 12.1 G/DL (ref 11.7–15.7)
IMM GRANULOCYTES # BLD: 0 10E3/UL
IMM GRANULOCYTES NFR BLD: 0 %
LYMPHOCYTES # BLD AUTO: 2.2 10E3/UL (ref 0.8–5.3)
LYMPHOCYTES NFR BLD AUTO: 42 %
MCH RBC QN AUTO: 28.4 PG (ref 26.5–33)
MCHC RBC AUTO-ENTMCNC: 32.2 G/DL (ref 31.5–36.5)
MCV RBC AUTO: 88 FL (ref 78–100)
MONOCYTES # BLD AUTO: 0.4 10E3/UL (ref 0–1.3)
MONOCYTES NFR BLD AUTO: 7 %
NEUTROPHILS # BLD AUTO: 2.4 10E3/UL (ref 1.6–8.3)
NEUTROPHILS NFR BLD AUTO: 47 %
PLATELET # BLD AUTO: 317 10E3/UL (ref 150–450)
RBC # BLD AUTO: 4.26 10E6/UL (ref 3.8–5.2)
WBC # BLD AUTO: 5.2 10E3/UL (ref 4–11)

## 2025-01-07 PROCEDURE — 80053 COMPREHEN METABOLIC PANEL: CPT | Performed by: PHYSICIAN ASSISTANT

## 2025-01-07 PROCEDURE — 85025 COMPLETE CBC W/AUTO DIFF WBC: CPT | Performed by: PHYSICIAN ASSISTANT

## 2025-01-07 PROCEDURE — 84443 ASSAY THYROID STIM HORMONE: CPT | Performed by: PHYSICIAN ASSISTANT

## 2025-01-07 PROCEDURE — 99395 PREV VISIT EST AGE 18-39: CPT | Performed by: PHYSICIAN ASSISTANT

## 2025-01-07 PROCEDURE — 90480 ADMN SARSCOV2 VAC 1/ONLY CMP: CPT | Performed by: PHYSICIAN ASSISTANT

## 2025-01-07 PROCEDURE — 82306 VITAMIN D 25 HYDROXY: CPT | Performed by: PHYSICIAN ASSISTANT

## 2025-01-07 PROCEDURE — 36415 COLL VENOUS BLD VENIPUNCTURE: CPT | Performed by: PHYSICIAN ASSISTANT

## 2025-01-07 PROCEDURE — 80061 LIPID PANEL: CPT | Performed by: PHYSICIAN ASSISTANT

## 2025-01-07 PROCEDURE — 91320 SARSCV2 VAC 30MCG TRS-SUC IM: CPT | Performed by: PHYSICIAN ASSISTANT

## 2025-01-07 SDOH — HEALTH STABILITY: PHYSICAL HEALTH: ON AVERAGE, HOW MANY DAYS PER WEEK DO YOU ENGAGE IN MODERATE TO STRENUOUS EXERCISE (LIKE A BRISK WALK)?: 2 DAYS

## 2025-01-07 ASSESSMENT — SOCIAL DETERMINANTS OF HEALTH (SDOH): HOW OFTEN DO YOU GET TOGETHER WITH FRIENDS OR RELATIVES?: ONCE A WEEK

## 2025-01-07 ASSESSMENT — PAIN SCALES - GENERAL: PAINLEVEL_OUTOF10: NO PAIN (0)

## 2025-01-07 NOTE — PATIENT INSTRUCTIONS
Patient Education   Preventive Care Advice   This is general advice given by our system to help you stay healthy. However, your care team may have specific advice just for you. Please talk to your care team about your preventive care needs.  Nutrition  Eat 5 or more servings of fruits and vegetables each day.  Try wheat bread, brown rice and whole grain pasta (instead of white bread, rice, and pasta).  Get enough calcium and vitamin D. Check the label on foods and aim for 100% of the RDA (recommended daily allowance).  Lifestyle  Exercise at least 150 minutes each week  (30 minutes a day, 5 days a week).  Do muscle strengthening activities 2 days a week. These help control your weight and prevent disease.  No smoking.  Wear sunscreen to prevent skin cancer.  Have a dental exam and cleaning every 6 months.  Yearly exams  See your health care team every year to talk about:  Any changes in your health.  Any medicines your care team has prescribed.  Preventive care, family planning, and ways to prevent chronic diseases.  Shots (vaccines)   HPV shots (up to age 26), if you've never had them before.  Hepatitis B shots (up to age 59), if you've never had them before.  COVID-19 shot: Get this shot when it's due.  Flu shot: Get a flu shot every year.  Tetanus shot: Get a tetanus shot every 10 years.  Pneumococcal, hepatitis A, and RSV shots: Ask your care team if you need these based on your risk.  Shingles shot (for age 50 and up)  General health tests  Diabetes screening:  Starting at age 35, Get screened for diabetes at least every 3 years.  If you are younger than age 35, ask your care team if you should be screened for diabetes.  Cholesterol test: At age 39, start having a cholesterol test every 5 years, or more often if advised.  Bone density scan (DEXA): At age 50, ask your care team if you should have this scan for osteoporosis (brittle bones).  Hepatitis C: Get tested at least once in your life.  STIs (sexually  transmitted infections)  Before age 24: Ask your care team if you should be screened for STIs.  After age 24: Get screened for STIs if you're at risk. You are at risk for STIs (including HIV) if:  You are sexually active with more than one person.  You don't use condoms every time.  You or a partner was diagnosed with a sexually transmitted infection.  If you are at risk for HIV, ask about PrEP medicine to prevent HIV.  Get tested for HIV at least once in your life, whether you are at risk for HIV or not.  Cancer screening tests  Cervical cancer screening: If you have a cervix, begin getting regular cervical cancer screening tests starting at age 21.  Breast cancer scan (mammogram): If you've ever had breasts, begin having regular mammograms starting at age 40. This is a scan to check for breast cancer.  Colon cancer screening: It is important to start screening for colon cancer at age 45.  Have a colonoscopy test every 10 years (or more often if you're at risk) Or, ask your provider about stool tests like a FIT test every year or Cologuard test every 3 years.  To learn more about your testing options, visit:   .  For help making a decision, visit:   https://bit.ly/ic29747.  Prostate cancer screening test: If you have a prostate, ask your care team if a prostate cancer screening test (PSA) at age 55 is right for you.  Lung cancer screening: If you are a current or former smoker ages 50 to 80, ask your care team if ongoing lung cancer screenings are right for you.  For informational purposes only. Not to replace the advice of your health care provider. Copyright   2023 [employer]. All rights reserved. Clinically reviewed by the [facility] Transitions Program. [employer] [id number] - REV 01/24.

## 2025-01-07 NOTE — PROGRESS NOTES
"Preventive Care Visit  [facility]  Kenny Nowak PA-C, Family Medicine  Jan 7, 2025      Assessment & Plan       ICD-10-CM    1. Routine general medical examination at a health care facility  Z00.00 TSH with free T4 reflex     Comprehensive metabolic panel (BMP + Alb, Alk Phos, ALT, AST, Total. Bili, TP)     CBC with platelets and differential     Lipid panel reflex to direct LDL Fasting     TSH with free T4 reflex     Comprehensive metabolic panel (BMP + Alb, Alk Phos, ALT, AST, Total. Bili, TP)     CBC with platelets and differential     Lipid panel reflex to direct LDL Fasting      2. Vitamin D deficiency  E55.9 Vitamin D Deficiency     Vitamin D Deficiency      Work on Healthy diet and exercise. Getting heart rate elevated for 30 mins most days of week.  Labs pending- follow up  dependant on labs.   Con't to monitor blood pressure at home.     BMI  Estimated body mass index is 28.94 kg/m  as calculated from the following:    Height as of this encounter: 1.753 m (5' 9\").    Weight as of this encounter: 88.9 kg (196 lb).   Weight management plan: Discussed healthy diet and exercise guidelines    Counseling  Appropriate preventive services were addressed with this patient via screening, questionnaire, or discussion as appropriate for fall prevention, nutrition, physical activity, Tobacco-use cessation, social engagement, weight loss and cognition.  Checklist reviewing preventive services available has been given to the patient.  Reviewed patient's diet, addressing concerns and/or questions.   She is at risk for lack of exercise and has been provided with information to increase physical activity for the benefit of her well-being.   The patient was instructed to see the dentist every 6 months.     Jose Pittman is a 29 year old, presenting for the following:  Physical           HPI  Patient is here today for physical.  She is also been monitoring her blood pressure.  She had " elevated blood pressure readings secondary to primary hyperaldosteronism.  She has seen endocrinology in the past.  She is status post adrenalectomy. Care Everywhere Reviewed  She was told she graduated from endocrinology.    Health Care Directive  Patient does not have a Health Care Directive: Discussed advance care planning with patient; information given to patient to review.      1/7/2025   General Health   How would you rate your overall physical health? Good   Feel stress (tense, anxious, or unable to sleep) Only a little   (!) STRESS CONCERN      1/7/2025   Nutrition   Three or more servings of calcium each day? (!) NO   Diet: Regular (no restrictions)   How many servings of fruit and vegetables per day? (!) 0-1   How many sweetened beverages each day? 0-1         1/7/2025   Exercise   Days per week of moderate/strenous exercise 2 days   (!) EXERCISE CONCERN      1/7/2025   Social Factors   Frequency of gathering with friends or relatives Once a week   Worry food won't last until get money to buy more No   Food not last or not have enough money for food? No   Do you have housing? (Housing is defined as stable permanent housing and does not include staying ouside in a car, in a tent, in an abandoned building, in an overnight shelter, or couch-surfing.) No   Are you worried about losing your housing? No   Lack of transportation? No   Unable to get utilities (heat,electricity)? No   Want help with housing or utility concern? No   (!) HOUSING CONCERN PRESENT      1/7/2025   Dental   Dentist two times every year? (!) NO            Today's PHQ-2 Score:       1/7/2025     2:09 PM   PHQ-2 ( 1999 [employer])   Q1: Little interest or pleasure in doing things 0   Q2: Feeling down, depressed or hopeless 0   PHQ-2 Score 0    Q1: Little interest or pleasure in doing things Not at all   Q2: Feeling down, depressed or hopeless Not at all   PHQ-2 Score 0       Patient-reported           1/7/2025   Substance Use   Alcohol more  than 3/day or more than 7/wk Not Applicable   Do you use any other substances recreationally? No     Social History     Tobacco Use     Smoking status: Never     Smokeless tobacco: Never   Vaping Use     Vaping status: Never Used   Substance Use Topics     Alcohol use: Never     Drug use: Never          Mammogram Screening - Patient under 40 years of age: Routine Mammogram Screening not recommended.         2025   STI Screening   New sexual partner(s) since last STI/HIV test? No     History of abnormal Pap smear: No - age 30-64 HPV with reflex Pap every 5 years recommended        10/24/2023     2:19 PM 2/15/2021     4:38 PM   PAP / HPV   PAP Negative for Intraepithelial Lesion or Malignancy (NILM)     PAP-ABSTRACT  See Scanned Document           This result is from an external source.           2025   Contraception/Family Planning   Questions about contraception or family planning No        Reviewed and updated as needed this visit by Provider   Tobacco  Allergies  Meds  Problems  Med Hx  Surg Hx  Fam Hx            Past Medical History:   Diagnosis Date     Primary aldosteronism (H)      Secondary hypertension      Past Surgical History:   Procedure Laterality Date     ADRENALECTOMY Left 2023     IR ADRENAL VENOGRAM BILATERAL  10/17/2022     IR LYMPH NODE BIOPSY  2018     OB History    Para Term  AB Living   1 1 0 1 0 1   SAB IAB Ectopic Multiple Live Births   0 0 0 0 1      # Outcome Date GA Lbr Florentin/2nd Weight Sex Type Anes PTL Lv   1  24 30w3d  1.67 kg (3 lb 10.9 oz) M CS-Unspec   ANDRESSA      Name: Sahil Smith      Apgar1: 7  Apgar5: 9     Lab work is in process  Labs reviewed in EPIC  BP Readings from Last 3 Encounters:   25 128/86   24 (!) 140/88   24 126/85    Wt Readings from Last 3 Encounters:   25 88.9 kg (196 lb)   24 88.5 kg (195 lb 3.2 oz)   24 88.5 kg (195 lb 3.2 oz)                  Patient Active Problem List  "  Diagnosis     Vitamin D deficiency     Tuberculosis of peripheral lymph nodes     Redness of both eyes     Palpitation     Myopia of both eyes with astigmatism     Hyperparathyroidism due to vitamin D deficiency (H)     Dry eyes     Primary aldosteronism (H)     Anemia during pregnancy in first trimester     High-risk pregnancy     Past Surgical History:   Procedure Laterality Date     ADRENALECTOMY Left 05/30/2023     IR ADRENAL VENOGRAM BILATERAL  10/17/2022     IR LYMPH NODE BIOPSY  12/13/2018       Social History     Tobacco Use     Smoking status: Never     Smokeless tobacco: Never   Substance Use Topics     Alcohol use: Never     History reviewed. No pertinent family history.      Current Outpatient Medications   Medication Sig Dispense Refill     Prenatal Vit-Fe Fumarate-FA (PRENATAL VITAMINS PO)        No Known Allergies  Recent Labs   Lab Test 04/12/24  1324 02/16/24  1148 10/13/23  0826 05/01/22  1120 02/11/22  0919   A1C  --   --  5.5  --   --    LDL  --   --  118*  --   --    HDL  --   --  66  --   --    TRIG  --   --  47  --   --    ALT  --  11  --   --  30   CR 0.64 0.70  --    < > 0.65   GFRESTIMATED >90 >90  --    < > >90   POTASSIUM 3.9 4.0  --    < > 3.5   TSH  --  0.51 0.95   < >  --     < > = values in this interval not displayed.          Review of Systems  Constitutional, HEENT, cardiovascular, pulmonary, gi and gu systems are negative, except as otherwise noted.     Objective    Exam  /86   Pulse 76   Temp (!) 96.7  F (35.9  C) (Tympanic)   Resp 16   Ht 1.753 m (5' 9\")   Wt 88.9 kg (196 lb)   SpO2 99%   Breastfeeding Yes   BMI 28.94 kg/m     Estimated body mass index is 28.94 kg/m  as calculated from the following:    Height as of this encounter: 1.753 m (5' 9\").    Weight as of this encounter: 88.9 kg (196 lb).    Physical Exam  GENERAL: alert and no distress  EYES: Eyes grossly normal to inspection, PERRL and conjunctivae and sclerae normal  HENT: ear canals and TM's normal, " nose and mouth without ulcers or lesions  NECK: no adenopathy, no asymmetry, masses, or scars  RESP: lungs clear to auscultation - no rales, rhonchi or wheezes  CV: regular rate and rhythm, normal S1 S2, no S3 or S4, no murmur, click or rub, no peripheral edema  ABDOMEN: soft, nontender, no hepatosplenomegaly, no masses and bowel sounds normal  MS: no gross musculoskeletal defects noted, no edema  SKIN: no suspicious lesions or rashes  NEURO: Normal strength and tone, mentation intact and speech normal  PSYCH: mentation appears normal, affect normal/bright  Deferred female exam today.        Signed Electronically by: Kenny Nowak PA-C

## 2025-01-08 LAB
ALBUMIN SERPL BCG-MCNC: 4.6 G/DL (ref 3.5–5.2)
ALP SERPL-CCNC: 89 U/L (ref 40–150)
ALT SERPL W P-5'-P-CCNC: 21 U/L (ref 0–50)
ANION GAP SERPL CALCULATED.3IONS-SCNC: 10 MMOL/L (ref 7–15)
AST SERPL W P-5'-P-CCNC: 21 U/L (ref 0–45)
BILIRUB SERPL-MCNC: 0.3 MG/DL
BUN SERPL-MCNC: 16 MG/DL (ref 6–20)
CALCIUM SERPL-MCNC: 9.7 MG/DL (ref 8.8–10.4)
CHLORIDE SERPL-SCNC: 106 MMOL/L (ref 98–107)
CHOLEST SERPL-MCNC: 212 MG/DL
CREAT SERPL-MCNC: 0.85 MG/DL (ref 0.51–0.95)
EGFRCR SERPLBLD CKD-EPI 2021: >90 ML/MIN/1.73M2
FASTING STATUS PATIENT QL REPORTED: YES
FASTING STATUS PATIENT QL REPORTED: YES
GLUCOSE SERPL-MCNC: 78 MG/DL (ref 70–99)
HCO3 SERPL-SCNC: 26 MMOL/L (ref 22–29)
HDLC SERPL-MCNC: 64 MG/DL
LDLC SERPL CALC-MCNC: 135 MG/DL
NONHDLC SERPL-MCNC: 148 MG/DL
POTASSIUM SERPL-SCNC: 4.2 MMOL/L (ref 3.4–5.3)
PROT SERPL-MCNC: 8.1 G/DL (ref 6.4–8.3)
SODIUM SERPL-SCNC: 142 MMOL/L (ref 135–145)
TRIGL SERPL-MCNC: 65 MG/DL
TSH SERPL DL<=0.005 MIU/L-ACNC: 0.48 UIU/ML (ref 0.3–4.2)
VIT D+METAB SERPL-MCNC: 30 NG/ML (ref 20–50)

## (undated) RX ORDER — SODIUM CHLORIDE 9 MG/ML
INJECTION, SOLUTION INTRAVENOUS
Status: DISPENSED
Start: 2022-10-17

## (undated) RX ORDER — POTASSIUM CHLORIDE 1.5 G/1.58G
POWDER, FOR SOLUTION ORAL
Status: DISPENSED
Start: 2022-10-17

## (undated) RX ORDER — FENTANYL CITRATE 50 UG/ML
INJECTION, SOLUTION INTRAMUSCULAR; INTRAVENOUS
Status: DISPENSED
Start: 2022-10-17

## (undated) RX ORDER — POTASSIUM CHLORIDE 750 MG/1
TABLET, EXTENDED RELEASE ORAL
Status: DISPENSED
Start: 2022-10-17